# Patient Record
Sex: MALE | Race: BLACK OR AFRICAN AMERICAN | NOT HISPANIC OR LATINO
[De-identification: names, ages, dates, MRNs, and addresses within clinical notes are randomized per-mention and may not be internally consistent; named-entity substitution may affect disease eponyms.]

---

## 2017-06-02 ENCOUNTER — APPOINTMENT (OUTPATIENT)
Dept: HEART AND VASCULAR | Facility: CLINIC | Age: 57
End: 2017-06-02

## 2017-09-26 ENCOUNTER — APPOINTMENT (OUTPATIENT)
Dept: HEART AND VASCULAR | Facility: CLINIC | Age: 57
End: 2017-09-26
Payer: COMMERCIAL

## 2017-09-26 VITALS
HEIGHT: 78 IN | BODY MASS INDEX: 34.71 KG/M2 | SYSTOLIC BLOOD PRESSURE: 140 MMHG | DIASTOLIC BLOOD PRESSURE: 65 MMHG | WEIGHT: 300 LBS | HEART RATE: 83 BPM

## 2017-09-26 DIAGNOSIS — E11.9 TYPE 2 DIABETES MELLITUS W/OUT COMPLICATIONS: ICD-10-CM

## 2017-09-26 DIAGNOSIS — E55.9 VITAMIN D DEFICIENCY, UNSPECIFIED: ICD-10-CM

## 2017-09-26 DIAGNOSIS — I10 ESSENTIAL (PRIMARY) HYPERTENSION: ICD-10-CM

## 2017-09-26 DIAGNOSIS — E78.5 HYPERLIPIDEMIA, UNSPECIFIED: ICD-10-CM

## 2017-09-26 DIAGNOSIS — M10.9 GOUT, UNSPECIFIED: ICD-10-CM

## 2017-09-26 DIAGNOSIS — Z82.49 FAMILY HISTORY OF ISCHEMIC HEART DISEASE AND OTHER DISEASES OF THE CIRCULATORY SYSTEM: ICD-10-CM

## 2017-09-26 PROCEDURE — 93000 ELECTROCARDIOGRAM COMPLETE: CPT

## 2017-09-26 PROCEDURE — 99205 OFFICE O/P NEW HI 60 MIN: CPT

## 2017-09-27 PROBLEM — E78.5 HLD (HYPERLIPIDEMIA): Status: ACTIVE | Noted: 2017-09-26

## 2017-09-27 PROBLEM — I10 HTN (HYPERTENSION): Status: ACTIVE | Noted: 2017-09-26

## 2017-09-27 PROBLEM — E11.9 DIABETES MELLITUS, TYPE II: Status: ACTIVE | Noted: 2017-09-26

## 2017-09-27 PROBLEM — E55.9 VITAMIN D INSUFFICIENCY: Status: ACTIVE | Noted: 2017-09-26

## 2017-09-27 PROBLEM — M10.9 GOUT: Status: ACTIVE | Noted: 2017-09-26

## 2017-10-09 ENCOUNTER — FORM ENCOUNTER (OUTPATIENT)
Age: 57
End: 2017-10-09

## 2017-10-10 ENCOUNTER — OUTPATIENT (OUTPATIENT)
Dept: OUTPATIENT SERVICES | Facility: HOSPITAL | Age: 57
LOS: 1 days | End: 2017-10-10
Payer: COMMERCIAL

## 2017-10-10 DIAGNOSIS — Z98.1 ARTHRODESIS STATUS: Chronic | ICD-10-CM

## 2017-10-10 DIAGNOSIS — Z96.652 PRESENCE OF LEFT ARTIFICIAL KNEE JOINT: Chronic | ICD-10-CM

## 2017-10-10 DIAGNOSIS — R00.2 PALPITATIONS: ICD-10-CM

## 2017-10-10 PROCEDURE — 93306 TTE W/DOPPLER COMPLETE: CPT | Mod: 26

## 2017-10-10 PROCEDURE — 93306 TTE W/DOPPLER COMPLETE: CPT

## 2017-10-12 ENCOUNTER — TRANSCRIPTION ENCOUNTER (OUTPATIENT)
Age: 57
End: 2017-10-12

## 2017-11-21 ENCOUNTER — APPOINTMENT (OUTPATIENT)
Dept: HEART AND VASCULAR | Facility: CLINIC | Age: 57
End: 2017-11-21
Payer: COMMERCIAL

## 2017-11-21 VITALS
HEART RATE: 87 BPM | HEIGHT: 78 IN | SYSTOLIC BLOOD PRESSURE: 155 MMHG | WEIGHT: 300 LBS | BODY MASS INDEX: 34.71 KG/M2 | DIASTOLIC BLOOD PRESSURE: 78 MMHG

## 2017-11-21 PROCEDURE — 93000 ELECTROCARDIOGRAM COMPLETE: CPT

## 2017-11-21 PROCEDURE — 99215 OFFICE O/P EST HI 40 MIN: CPT

## 2017-12-08 ENCOUNTER — OUTPATIENT (OUTPATIENT)
Dept: OUTPATIENT SERVICES | Facility: HOSPITAL | Age: 57
LOS: 1 days | End: 2017-12-08
Payer: COMMERCIAL

## 2017-12-08 DIAGNOSIS — Z96.652 PRESENCE OF LEFT ARTIFICIAL KNEE JOINT: Chronic | ICD-10-CM

## 2017-12-08 DIAGNOSIS — Z98.1 ARTHRODESIS STATUS: Chronic | ICD-10-CM

## 2017-12-08 PROCEDURE — 75561 CARDIAC MRI FOR MORPH W/DYE: CPT | Mod: 26

## 2017-12-08 PROCEDURE — 75561 CARDIAC MRI FOR MORPH W/DYE: CPT

## 2017-12-08 PROCEDURE — A9577: CPT

## 2017-12-14 ENCOUNTER — OUTPATIENT (OUTPATIENT)
Dept: OUTPATIENT SERVICES | Facility: HOSPITAL | Age: 57
LOS: 1 days | Discharge: HOME | End: 2017-12-14
Payer: COMMERCIAL

## 2017-12-14 DIAGNOSIS — E55.9 VITAMIN D DEFICIENCY, UNSPECIFIED: ICD-10-CM

## 2017-12-14 DIAGNOSIS — Z96.652 PRESENCE OF LEFT ARTIFICIAL KNEE JOINT: Chronic | ICD-10-CM

## 2017-12-14 DIAGNOSIS — R55 SYNCOPE AND COLLAPSE: ICD-10-CM

## 2017-12-14 DIAGNOSIS — M10.9 GOUT, UNSPECIFIED: ICD-10-CM

## 2017-12-14 DIAGNOSIS — Z79.82 LONG TERM (CURRENT) USE OF ASPIRIN: ICD-10-CM

## 2017-12-14 DIAGNOSIS — I10 ESSENTIAL (PRIMARY) HYPERTENSION: ICD-10-CM

## 2017-12-14 DIAGNOSIS — Z98.1 ARTHRODESIS STATUS: Chronic | ICD-10-CM

## 2017-12-14 DIAGNOSIS — Z96.652 PRESENCE OF LEFT ARTIFICIAL KNEE JOINT: ICD-10-CM

## 2017-12-14 DIAGNOSIS — E11.9 TYPE 2 DIABETES MELLITUS WITHOUT COMPLICATIONS: ICD-10-CM

## 2017-12-14 DIAGNOSIS — E78.5 HYPERLIPIDEMIA, UNSPECIFIED: ICD-10-CM

## 2017-12-14 DIAGNOSIS — Z79.84 LONG TERM (CURRENT) USE OF ORAL HYPOGLYCEMIC DRUGS: ICD-10-CM

## 2017-12-14 PROCEDURE — 93660 TILT TABLE EVALUATION: CPT | Mod: 26

## 2017-12-14 PROCEDURE — 93660 TILT TABLE EVALUATION: CPT

## 2018-01-30 ENCOUNTER — APPOINTMENT (OUTPATIENT)
Dept: HEART AND VASCULAR | Facility: CLINIC | Age: 58
End: 2018-01-30
Payer: COMMERCIAL

## 2018-01-30 VITALS
WEIGHT: 288 LBS | HEIGHT: 78 IN | SYSTOLIC BLOOD PRESSURE: 138 MMHG | BODY MASS INDEX: 33.32 KG/M2 | DIASTOLIC BLOOD PRESSURE: 69 MMHG | HEART RATE: 68 BPM

## 2018-01-30 PROCEDURE — 99215 OFFICE O/P EST HI 40 MIN: CPT | Mod: 25

## 2018-01-30 PROCEDURE — 93000 ELECTROCARDIOGRAM COMPLETE: CPT

## 2018-02-14 ENCOUNTER — OUTPATIENT (OUTPATIENT)
Dept: OUTPATIENT SERVICES | Facility: HOSPITAL | Age: 58
LOS: 1 days | Discharge: HOME | End: 2018-02-14
Payer: COMMERCIAL

## 2018-02-14 DIAGNOSIS — Z96.652 PRESENCE OF LEFT ARTIFICIAL KNEE JOINT: Chronic | ICD-10-CM

## 2018-02-14 DIAGNOSIS — Z98.1 ARTHRODESIS STATUS: Chronic | ICD-10-CM

## 2018-02-14 LAB — GLUCOSE BLDC GLUCOMTR-MCNC: 241 MG/DL — HIGH (ref 70–99)

## 2018-02-14 PROCEDURE — 33282: CPT

## 2018-02-14 PROCEDURE — 82962 GLUCOSE BLOOD TEST: CPT

## 2018-02-14 PROCEDURE — C1764: CPT

## 2018-04-20 ENCOUNTER — APPOINTMENT (OUTPATIENT)
Dept: HEART AND VASCULAR | Facility: CLINIC | Age: 58
End: 2018-04-20
Payer: COMMERCIAL

## 2018-04-20 VITALS
HEART RATE: 91 BPM | DIASTOLIC BLOOD PRESSURE: 74 MMHG | HEIGHT: 78 IN | WEIGHT: 288 LBS | SYSTOLIC BLOOD PRESSURE: 162 MMHG | BODY MASS INDEX: 33.32 KG/M2

## 2018-04-20 PROCEDURE — 99214 OFFICE O/P EST MOD 30 MIN: CPT | Mod: 25

## 2018-04-20 PROCEDURE — 93285 PRGRMG DEV EVAL SCRMS IP: CPT

## 2018-04-20 RX ORDER — TAMSULOSIN HYDROCHLORIDE 0.4 MG/1
0.4 CAPSULE ORAL
Qty: 90 | Refills: 0 | Status: DISCONTINUED | COMMUNITY
Start: 2017-11-17

## 2018-04-20 RX ORDER — METFORMIN ER 500 MG 500 MG/1
500 TABLET ORAL
Qty: 360 | Refills: 0 | Status: DISCONTINUED | COMMUNITY
Start: 2017-11-15

## 2018-04-20 RX ORDER — CYCLOBENZAPRINE HYDROCHLORIDE 10 MG/1
10 TABLET, FILM COATED ORAL
Qty: 30 | Refills: 0 | Status: DISCONTINUED | COMMUNITY
Start: 2018-03-05

## 2018-04-20 RX ORDER — SIMVASTATIN 40 MG/1
40 TABLET, FILM COATED ORAL
Qty: 90 | Refills: 0 | Status: DISCONTINUED | COMMUNITY
Start: 2017-09-23

## 2018-04-20 RX ORDER — LEVOCETIRIZINE DIHYDROCHLORIDE 5 MG/1
5 TABLET ORAL
Qty: 30 | Refills: 0 | Status: DISCONTINUED | COMMUNITY
Start: 2017-10-30

## 2018-04-20 RX ORDER — HYDROCODONE BITARTRATE AND ACETAMINOPHEN 5; 325 MG/1; MG/1
5-325 TABLET ORAL
Qty: 28 | Refills: 0 | Status: DISCONTINUED | COMMUNITY
Start: 2018-03-06

## 2018-04-20 RX ORDER — MOMETASONE 50 UG/1
50 SPRAY, METERED NASAL
Qty: 17 | Refills: 0 | Status: DISCONTINUED | COMMUNITY
Start: 2017-10-30

## 2018-04-20 RX ORDER — DOXYCYCLINE 100 MG/1
100 TABLET, FILM COATED ORAL
Qty: 20 | Refills: 0 | Status: DISCONTINUED | COMMUNITY
Start: 2017-12-08

## 2018-04-20 RX ORDER — DULAGLUTIDE 0.75 MG/.5ML
0.75 INJECTION, SOLUTION SUBCUTANEOUS
Qty: 4 | Refills: 0 | Status: DISCONTINUED | COMMUNITY
Start: 2018-01-04

## 2018-04-20 RX ORDER — OMEGA-3-ACID ETHYL ESTERS CAPSULES 1 G/1
1 CAPSULE, LIQUID FILLED ORAL
Qty: 360 | Refills: 0 | Status: DISCONTINUED | COMMUNITY
Start: 2018-01-05

## 2018-06-22 ENCOUNTER — APPOINTMENT (OUTPATIENT)
Dept: HEART AND VASCULAR | Facility: CLINIC | Age: 58
End: 2018-06-22
Payer: COMMERCIAL

## 2018-06-22 VITALS
DIASTOLIC BLOOD PRESSURE: 75 MMHG | WEIGHT: 285 LBS | HEIGHT: 78 IN | SYSTOLIC BLOOD PRESSURE: 148 MMHG | BODY MASS INDEX: 32.97 KG/M2 | HEART RATE: 67 BPM

## 2018-06-22 PROCEDURE — 93291 INTERROG DEV EVAL SCRMS IP: CPT

## 2018-08-30 ENCOUNTER — APPOINTMENT (OUTPATIENT)
Dept: PULMONOLOGY | Facility: CLINIC | Age: 58
End: 2018-08-30
Payer: COMMERCIAL

## 2018-09-20 ENCOUNTER — APPOINTMENT (OUTPATIENT)
Dept: PULMONOLOGY | Facility: CLINIC | Age: 58
End: 2018-09-20
Payer: COMMERCIAL

## 2018-09-20 VITALS
OXYGEN SATURATION: 97 % | HEIGHT: 78 IN | HEART RATE: 66 BPM | DIASTOLIC BLOOD PRESSURE: 84 MMHG | WEIGHT: 285 LBS | BODY MASS INDEX: 32.97 KG/M2 | SYSTOLIC BLOOD PRESSURE: 138 MMHG

## 2018-09-20 DIAGNOSIS — Z80.0 FAMILY HISTORY OF MALIGNANT NEOPLASM OF DIGESTIVE ORGANS: ICD-10-CM

## 2018-09-20 DIAGNOSIS — Z82.5 FAMILY HISTORY OF ASTHMA AND OTHER CHRONIC LOWER RESPIRATORY DISEASES: ICD-10-CM

## 2018-09-20 DIAGNOSIS — J45.909 UNSPECIFIED ASTHMA, UNCOMPLICATED: ICD-10-CM

## 2018-09-20 PROCEDURE — 99244 OFF/OP CNSLTJ NEW/EST MOD 40: CPT | Mod: 25

## 2018-09-20 PROCEDURE — G0008: CPT

## 2018-09-20 PROCEDURE — 36415 COLL VENOUS BLD VENIPUNCTURE: CPT

## 2018-09-20 PROCEDURE — 90686 IIV4 VACC NO PRSV 0.5 ML IM: CPT

## 2018-09-21 ENCOUNTER — RESULT REVIEW (OUTPATIENT)
Age: 58
End: 2018-09-21

## 2018-09-21 LAB
BASOPHILS # BLD AUTO: 0.03 K/UL
BASOPHILS NFR BLD AUTO: 0.4 %
DEPRECATED D DIMER PPP IA-ACNC: <150 NG/ML DDU
EOSINOPHIL # BLD AUTO: 0.18 K/UL
EOSINOPHIL NFR BLD AUTO: 2.2 %
HCT VFR BLD CALC: 42.7 %
HGB BLD-MCNC: 13.9 G/DL
IMM GRANULOCYTES NFR BLD AUTO: 0.1 %
LYMPHOCYTES # BLD AUTO: 3.13 K/UL
LYMPHOCYTES NFR BLD AUTO: 38.4 %
MAN DIFF?: NORMAL
MCHC RBC-ENTMCNC: 31.1 PG
MCHC RBC-ENTMCNC: 32.6 GM/DL
MCV RBC AUTO: 95.5 FL
MONOCYTES # BLD AUTO: 0.58 K/UL
MONOCYTES NFR BLD AUTO: 7.1 %
NEUTROPHILS # BLD AUTO: 4.22 K/UL
NEUTROPHILS NFR BLD AUTO: 51.8 %
NT-PROBNP SERPL-MCNC: 276 PG/ML
PLATELET # BLD AUTO: 345 K/UL
RBC # BLD: 4.47 M/UL
RBC # FLD: 13.5 %
WBC # FLD AUTO: 8.15 K/UL

## 2018-09-22 LAB
ALBUMIN SERPL ELPH-MCNC: 4.7 G/DL
ALP BLD-CCNC: 66 U/L
ALT SERPL-CCNC: 20 U/L
ANION GAP SERPL CALC-SCNC: 16 MMOL/L
AST SERPL-CCNC: 22 U/L
BILIRUB SERPL-MCNC: 1.3 MG/DL
BUN SERPL-MCNC: 11 MG/DL
CALCIUM SERPL-MCNC: 10.1 MG/DL
CHLORIDE SERPL-SCNC: 104 MMOL/L
CO2 SERPL-SCNC: 25 MMOL/L
CREAT SERPL-MCNC: 1.07 MG/DL
GLUCOSE SERPL-MCNC: 118 MG/DL
POTASSIUM SERPL-SCNC: 5.1 MMOL/L
PROT SERPL-MCNC: 7.3 G/DL
SODIUM SERPL-SCNC: 145 MMOL/L

## 2018-09-25 ENCOUNTER — CHART COPY (OUTPATIENT)
Age: 58
End: 2018-09-25

## 2018-10-11 ENCOUNTER — OTHER (OUTPATIENT)
Age: 58
End: 2018-10-11

## 2018-10-11 PROBLEM — Z92.29 HISTORY OF INFLUENZA VACCINATION: Status: RESOLVED | Noted: 2018-09-20 | Resolved: 2018-10-11

## 2018-10-12 ENCOUNTER — OUTPATIENT (OUTPATIENT)
Dept: OUTPATIENT SERVICES | Facility: HOSPITAL | Age: 58
LOS: 1 days | End: 2018-10-12
Payer: COMMERCIAL

## 2018-10-12 DIAGNOSIS — Z98.1 ARTHRODESIS STATUS: Chronic | ICD-10-CM

## 2018-10-12 DIAGNOSIS — Z96.652 PRESENCE OF LEFT ARTIFICIAL KNEE JOINT: Chronic | ICD-10-CM

## 2018-10-12 DIAGNOSIS — R06.02 SHORTNESS OF BREATH: ICD-10-CM

## 2018-10-12 PROCEDURE — 94060 EVALUATION OF WHEEZING: CPT

## 2018-10-12 PROCEDURE — 94618 PULMONARY STRESS TESTING: CPT | Mod: 26,59

## 2018-10-12 PROCEDURE — 94729 DIFFUSING CAPACITY: CPT | Mod: 26

## 2018-10-12 PROCEDURE — 94618 PULMONARY STRESS TESTING: CPT

## 2018-10-12 PROCEDURE — 94070 EVALUATION OF WHEEZING: CPT

## 2018-10-12 PROCEDURE — 94729 DIFFUSING CAPACITY: CPT

## 2018-10-12 PROCEDURE — 94726 PLETHYSMOGRAPHY LUNG VOLUMES: CPT

## 2018-10-12 PROCEDURE — 94070 EVALUATION OF WHEEZING: CPT | Mod: 26

## 2018-10-12 PROCEDURE — 94726 PLETHYSMOGRAPHY LUNG VOLUMES: CPT | Mod: 26

## 2018-10-12 PROCEDURE — 94010 BREATHING CAPACITY TEST: CPT | Mod: 26,59

## 2018-10-12 PROCEDURE — 94760 N-INVAS EAR/PLS OXIMETRY 1: CPT

## 2018-10-16 ENCOUNTER — OUTPATIENT (OUTPATIENT)
Dept: OUTPATIENT SERVICES | Facility: HOSPITAL | Age: 58
LOS: 1 days | End: 2018-10-16
Payer: COMMERCIAL

## 2018-10-16 ENCOUNTER — APPOINTMENT (OUTPATIENT)
Dept: SLEEP CENTER | Facility: HOME HEALTH | Age: 58
End: 2018-10-16
Payer: COMMERCIAL

## 2018-10-16 ENCOUNTER — APPOINTMENT (OUTPATIENT)
Dept: PULMONOLOGY | Facility: CLINIC | Age: 58
End: 2018-10-16
Payer: COMMERCIAL

## 2018-10-16 VITALS
WEIGHT: 293 LBS | BODY MASS INDEX: 33.9 KG/M2 | DIASTOLIC BLOOD PRESSURE: 90 MMHG | HEIGHT: 78 IN | OXYGEN SATURATION: 98 % | SYSTOLIC BLOOD PRESSURE: 160 MMHG | TEMPERATURE: 98.6 F | HEART RATE: 80 BPM

## 2018-10-16 DIAGNOSIS — Z96.652 PRESENCE OF LEFT ARTIFICIAL KNEE JOINT: Chronic | ICD-10-CM

## 2018-10-16 DIAGNOSIS — R06.02 SHORTNESS OF BREATH: ICD-10-CM

## 2018-10-16 DIAGNOSIS — Z91.89 OTHER SPECIFIED PERSONAL RISK FACTORS, NOT ELSEWHERE CLASSIFIED: ICD-10-CM

## 2018-10-16 DIAGNOSIS — Z92.29 PERSONAL HISTORY OF OTHER DRUG THERAPY: ICD-10-CM

## 2018-10-16 DIAGNOSIS — Z01.818 ENCOUNTER FOR OTHER PREPROCEDURAL EXAMINATION: ICD-10-CM

## 2018-10-16 DIAGNOSIS — Z98.1 ARTHRODESIS STATUS: Chronic | ICD-10-CM

## 2018-10-16 PROCEDURE — 95800 SLP STDY UNATTENDED: CPT

## 2018-10-16 PROCEDURE — 99215 OFFICE O/P EST HI 40 MIN: CPT

## 2018-10-18 DIAGNOSIS — G47.33 OBSTRUCTIVE SLEEP APNEA (ADULT) (PEDIATRIC): ICD-10-CM

## 2019-05-23 ENCOUNTER — MOBILE ON CALL (OUTPATIENT)
Age: 59
End: 2019-05-23

## 2019-05-23 ENCOUNTER — TRANSCRIPTION ENCOUNTER (OUTPATIENT)
Age: 59
End: 2019-05-23

## 2019-09-13 ENCOUNTER — APPOINTMENT (OUTPATIENT)
Dept: HEART AND VASCULAR | Facility: CLINIC | Age: 59
End: 2019-09-13
Payer: COMMERCIAL

## 2019-09-13 VITALS
HEIGHT: 78 IN | BODY MASS INDEX: 33.32 KG/M2 | DIASTOLIC BLOOD PRESSURE: 72 MMHG | HEART RATE: 79 BPM | WEIGHT: 288 LBS | SYSTOLIC BLOOD PRESSURE: 146 MMHG

## 2019-09-13 PROCEDURE — 93291 INTERROG DEV EVAL SCRMS IP: CPT

## 2019-09-13 NOTE — PHYSICAL EXAM
[General Appearance - Well Developed] : well developed [Normal Appearance] : normal appearance [Well Groomed] : well groomed [General Appearance - Well Nourished] : well nourished [No Deformities] : no deformities [General Appearance - In No Acute Distress] : no acute distress [Normal Conjunctiva] : the conjunctiva exhibited no abnormalities [Eyelids - No Xanthelasma] : the eyelids demonstrated no xanthelasmas [Normal Oral Mucosa] : normal oral mucosa [No Oral Pallor] : no oral pallor [No Oral Cyanosis] : no oral cyanosis [Normal Jugular Venous A Waves Present] : normal jugular venous A waves present [Normal Jugular Venous V Waves Present] : normal jugular venous V waves present [No Jugular Venous Salmon A Waves] : no jugular venous salmon A waves [Respiration, Rhythm And Depth] : normal respiratory rhythm and effort [Exaggerated Use Of Accessory Muscles For Inspiration] : no accessory muscle use [Auscultation Breath Sounds / Voice Sounds] : lungs were clear to auscultation bilaterally [Heart Rate And Rhythm] : heart rate and rhythm were normal [Heart Sounds] : normal S1 and S2 [Murmurs] : no murmurs present [Abdomen Soft] : soft [Abdomen Tenderness] : non-tender [Abdomen Mass (___ Cm)] : no abdominal mass palpated [Abnormal Walk] : normal gait [Gait - Sufficient For Exercise Testing] : the gait was sufficient for exercise testing [Nail Clubbing] : no clubbing of the fingernails [Cyanosis, Localized] : no localized cyanosis [Petechial Hemorrhages (___cm)] : no petechial hemorrhages [Skin Color & Pigmentation] : normal skin color and pigmentation [] : no rash [No Venous Stasis] : no venous stasis [Skin Lesions] : no skin lesions [No Skin Ulcers] : no skin ulcer [No Xanthoma] : no  xanthoma was observed [Oriented To Time, Place, And Person] : oriented to person, place, and time [Affect] : the affect was normal [Mood] : the mood was normal [No Anxiety] : not feeling anxious

## 2019-09-13 NOTE — HISTORY OF PRESENT ILLNESS
[None] : The patient complains of no symptoms [FreeTextEntry1] : 59 y/o M h/o HTN, HLD, DM II, palpitations and presyncope now s/p Medtronic ILR implant, who presents for follow-up.  \par \par Onset of symptoms in June 2016.  He started having episodes of dizzy spells, chest discomfort, diaphoresis and palpitations. The very first episode he felt presyncopal - resolved with rest.  He has not had such an intense episode since that time but they had been increasing in frequency - at one point he had four in one month. He notes that he started using multiple OTC supplements around that time, including cayenne pepper.  He has since stopped using cayenne pepper and continued to have presyncopal episodes with diaphoresis.  He denies any episodes of de syncope.  No family h/o SCD.  He underwent HUT test 12/2017 negative. However, he reports he had two episodes in one day around Annie time which he is concerned about. No episodes during the month of January. He has been working out at the gym, using the stationary bike and does not feel limited.  \par \par He had a Medtronic ILR implanted on 2/14/2018. Since implant, he states that he hasn't had any further episodes. He presents today feeling well. He has been going to work (nightshift), and going to the gym. He denies c/p, lightheadedness, sob and pre/syncope. At last visit, ILR interrogation was significant for a 22 second WCT episode consistent with SVT with aberrancy. The patient believes that he was sleeping during this episode. He was placed on Toprol 25 mg BID at that time.  \par \par cMRI 12/2017 LVEF 49%, mild global HK, RV normal size/function, hyperenhancement of superior and inferior right ventricular insertion points (nonspecific), \par Event monitor 8/24 - 9/4/16: SR/ST, rare PACs, slow WCT possibly NSVT (correlates with symptom)\par CTA 6/2016 with nonobstructive CAD

## 2019-09-13 NOTE — PROCEDURE
[NSR] : normal sinus rhythm [de-identified] : Reveal LINQ [de-identified] : Medtronic  [de-identified] : [id number] [de-identified] : Battery: Good\par 0 symptoms\par 0 tachy \par 0 pause\par 0 lesli\par 0 AT/AF

## 2019-09-13 NOTE — DISCUSSION/SUMMARY
[FreeTextEntry1] : 57 y/o M h/o HTN, HLD, DM II, palpitations and presyncope now s/p Medtronic ILR implant showing SVT with aberrancy, on Toprol with appropriate arrhythmia suppression. Continue current management. No changes made today. \par \par This patient has a planned orthopedic procedure scheduled with Dr. Segura and he is requesting clearance. From an EP/arrhythmia perspective, there are no contraindications to the planned procedure.

## 2020-03-20 ENCOUNTER — NON-APPOINTMENT (OUTPATIENT)
Age: 60
End: 2020-03-20

## 2020-10-20 ENCOUNTER — APPOINTMENT (OUTPATIENT)
Dept: HEART AND VASCULAR | Facility: CLINIC | Age: 60
End: 2020-10-20
Payer: COMMERCIAL

## 2020-10-20 VITALS
WEIGHT: 288 LBS | SYSTOLIC BLOOD PRESSURE: 133 MMHG | BODY MASS INDEX: 33.32 KG/M2 | HEART RATE: 87 BPM | DIASTOLIC BLOOD PRESSURE: 73 MMHG | HEIGHT: 78 IN

## 2020-10-20 PROCEDURE — 99212 OFFICE O/P EST SF 10 MIN: CPT | Mod: 25

## 2020-10-20 PROCEDURE — 99072 ADDL SUPL MATRL&STAF TM PHE: CPT

## 2020-10-20 PROCEDURE — 93285 PRGRMG DEV EVAL SCRMS IP: CPT

## 2020-10-20 NOTE — PHYSICAL EXAM
[General Appearance - Well Developed] : well developed [Normal Appearance] : normal appearance [General Appearance - Well Nourished] : well nourished [No Deformities] : no deformities [General Appearance - In No Acute Distress] : no acute distress [Well Groomed] : well groomed [Heart Rate And Rhythm] : heart rate and rhythm were normal [Murmurs] : no murmurs present [Heart Sounds] : normal S1 and S2 [Respiration, Rhythm And Depth] : normal respiratory rhythm and effort [Auscultation Breath Sounds / Voice Sounds] : lungs were clear to auscultation bilaterally [Exaggerated Use Of Accessory Muscles For Inspiration] : no accessory muscle use [Abdomen Soft] : soft [Abdomen Tenderness] : non-tender [Petechial Hemorrhages (___cm)] : no petechial hemorrhages [Cyanosis, Localized] : no localized cyanosis [Abdomen Mass (___ Cm)] : no abdominal mass palpated [Nail Clubbing] : no clubbing of the fingernails [Normal Conjunctiva] : the conjunctiva exhibited no abnormalities [Eyelids - No Xanthelasma] : the eyelids demonstrated no xanthelasmas [No Oral Pallor] : no oral pallor [No Oral Cyanosis] : no oral cyanosis [Normal Oral Mucosa] : normal oral mucosa [Normal Jugular Venous V Waves Present] : normal jugular venous V waves present [Normal Jugular Venous A Waves Present] : normal jugular venous A waves present [No Jugular Venous Salmon A Waves] : no jugular venous salmon A waves [Gait - Sufficient For Exercise Testing] : the gait was sufficient for exercise testing [Abnormal Walk] : normal gait [Skin Color & Pigmentation] : normal skin color and pigmentation [] : no rash [No Venous Stasis] : no venous stasis [Skin Lesions] : no skin lesions [No Skin Ulcers] : no skin ulcer [No Xanthoma] : no  xanthoma was observed [Oriented To Time, Place, And Person] : oriented to person, place, and time [Mood] : the mood was normal [Affect] : the affect was normal [No Anxiety] : not feeling anxious

## 2020-10-20 NOTE — END OF VISIT
[>50% of Time Spent on Counseling and Coordination of Care for  ___] : Greater than 50% of the encounter time was spent on counseling and coordination of care for [unfilled] [] : Nurse Practitioner [Time Spent: ___ minutes] : I have spent [unfilled] minutes of face to face time with the patient

## 2020-10-26 NOTE — PROCEDURE
[NSR] : normal sinus rhythm [de-identified] : Medtronic  [de-identified] : Reveal LINQ [de-identified] : [id number] [de-identified] : Battery: Good\par 0 symptoms\par 9 tachy - likely ST \par 0 pause\par 0 lesli\par 2 AF, longest 15h\par

## 2020-10-26 NOTE — HISTORY OF PRESENT ILLNESS
[None] : The patient complains of no symptoms [FreeTextEntry1] : 59 y/o M h/o HTN, HLD, DM II, palpitations and presyncope now s/p Medtronic ILR implant, who presents for follow-up.  \par \par Onset of symptoms in June 2016.  He started having episodes of dizzy spells, chest discomfort, diaphoresis and palpitations. The very first episode he felt presyncopal - resolved with rest.  He has not had such an intense episode since that time but they had been increasing in frequency - at one point he had four in one month. He notes that he started using multiple OTC supplements around that time, including cayenne pepper.  He has since stopped using cayenne pepper and continued to have presyncopal episodes with diaphoresis.  He denies any episodes of de syncope.  No family h/o SCD.  He underwent HUT test 12/2017 negative. However, he reports he had two episodes in one day around Annie time which he is concerned about. No episodes during the month of January. He has been working out at the gym, using the stationary bike and does not feel limited.  \par \par He had a Medtronic ILR implanted on 2/14/2018. Since implant, he states that he hasn't had any further episodes. He has been going to work (nightshift), and going to the gym. He denies c/p, lightheadedness, sob and pre/syncope. Past ILR interrogation was significant for a 22 second WCT episode consistent with SVT with aberrancy. The patient believes that he was sleeping during this episode. He was placed on Toprol 25 mg BID at that time.  \par \par He presents today feeling well. 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 He feels better on this schedule and denies any episodes of syncope, lightheadedness, palpitations, sob or c/p.\par \par cMRI 12/2017 LVEF 49%, mild global HK, RV normal size/function, hyperenhancement of superior and inferior right ventricular insertion points (nonspecific), \par Event monitor 8/24 - 9/4/16: SR/ST, rare PACs, slow WCT possibly NSVT (correlates with symptom)\par CTA 6/2016 with nonobstructive CAD

## 2020-10-26 NOTE — REASON FOR VISIT
[Follow-Up - Clinic] : a clinic follow-up of [Atrial Fibrillation] : atrial fibrillation [Palpitations] : palpitations [Follow-up Device Check] : follow-up device check visit [Arrhythmias (seen on stored data)] : arrhythmias seen on stored data

## 2021-02-16 ENCOUNTER — RX RENEWAL (OUTPATIENT)
Age: 61
End: 2021-02-16

## 2021-03-11 ENCOUNTER — APPOINTMENT (OUTPATIENT)
Dept: HEART AND VASCULAR | Facility: CLINIC | Age: 61
End: 2021-03-11
Payer: COMMERCIAL

## 2021-03-11 ENCOUNTER — NON-APPOINTMENT (OUTPATIENT)
Age: 61
End: 2021-03-11

## 2021-03-11 PROCEDURE — G2066: CPT

## 2021-03-11 PROCEDURE — 93298 REM INTERROG DEV EVAL SCRMS: CPT

## 2021-04-15 ENCOUNTER — NON-APPOINTMENT (OUTPATIENT)
Age: 61
End: 2021-04-15

## 2021-04-15 ENCOUNTER — APPOINTMENT (OUTPATIENT)
Dept: HEART AND VASCULAR | Facility: CLINIC | Age: 61
End: 2021-04-15
Payer: COMMERCIAL

## 2021-04-15 PROCEDURE — 93298 REM INTERROG DEV EVAL SCRMS: CPT

## 2021-04-15 PROCEDURE — G2066: CPT

## 2021-04-16 ENCOUNTER — RX RENEWAL (OUTPATIENT)
Age: 61
End: 2021-04-16

## 2021-05-11 ENCOUNTER — APPOINTMENT (OUTPATIENT)
Dept: HEART AND VASCULAR | Facility: CLINIC | Age: 61
End: 2021-05-11
Payer: COMMERCIAL

## 2021-05-11 VITALS
HEART RATE: 83 BPM | WEIGHT: 270 LBS | SYSTOLIC BLOOD PRESSURE: 135 MMHG | BODY MASS INDEX: 31.24 KG/M2 | DIASTOLIC BLOOD PRESSURE: 69 MMHG | HEIGHT: 78 IN

## 2021-05-11 DIAGNOSIS — R00.2 PALPITATIONS: ICD-10-CM

## 2021-05-11 PROCEDURE — 93285 PRGRMG DEV EVAL SCRMS IP: CPT

## 2021-05-11 PROCEDURE — 99072 ADDL SUPL MATRL&STAF TM PHE: CPT

## 2021-05-11 PROCEDURE — 99213 OFFICE O/P EST LOW 20 MIN: CPT | Mod: 25

## 2021-05-11 NOTE — HISTORY OF PRESENT ILLNESS
[None] : The patient complains of no symptoms [FreeTextEntry1] : 62 y/o M h/o HTN, HLD, DM II, palpitations and presyncope now s/p Medtronic ILR implant with paroxysmal atrial fibrillation detected who presents for follow-up.  \par \par Onset of symptoms in June 2016.  He started having episodes of dizzy spells, chest discomfort, diaphoresis and palpitations. The very first episode he felt presyncopal - resolved with rest.  He has not had such an intense episode since that time but they had been increasing in frequency - at one point he had four in one month. He notes that he started using multiple OTC supplements around that time, including cayenne pepper.  He has since stopped using cayenne pepper and continued to have presyncopal episodes with diaphoresis.  He denies any episodes of de syncope.  No family h/o SCD.  He underwent HUT test 12/2017 negative. \par \par He had a Medtronic ILR implanted on 2/14/2018. Since implant, he states that he hasn't had any further episodes.  Past ILR interrogation was significant for a 22 second WCT episode consistent with SVT with aberrancy. The patient believes that he was sleeping during this episode. He was placed on Toprol 25 mg BID at that time.  He had PAF noted in 8/2020; he was unaware.  Started on Eliquis and denies any bleeding issues.\par \par He presents today feeling well. 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 He feels better on this schedule and denies any episodes of syncope, lightheadedness, palpitations, sob or c/p.  Hoping to return to work this Summer.\par \par cMRI 12/2017 LVEF 49%, mild global HK, RV normal size/function, hyperenhancement of superior and inferior right ventricular insertion points (nonspecific), \par Event monitor 8/24 - 9/4/16: SR/ST, rare PACs, slow WCT possibly NSVT (correlates with symptom)\par CTA 6/2016 with nonobstructive CAD

## 2021-05-11 NOTE — PROCEDURE
[NSR] : normal sinus rhythm [de-identified] : Medtronic  [de-identified] : Reveal LINQ [de-identified] : [id number] [de-identified] : Battery: Good\par No new auto or symptom events

## 2021-05-11 NOTE — REASON FOR VISIT
[Follow-Up - Clinic] : a clinic follow-up of [Atrial Fibrillation] : atrial fibrillation [Palpitations] : palpitations [Follow-up Device Check] : is here today for a follow-up device check visit for

## 2021-05-11 NOTE — PHYSICAL EXAM
[General Appearance - Well Developed] : well developed [Normal Appearance] : normal appearance [Well Groomed] : well groomed [General Appearance - Well Nourished] : well nourished [No Deformities] : no deformities [General Appearance - In No Acute Distress] : no acute distress [Heart Rate And Rhythm] : heart rate and rhythm were normal [Heart Sounds] : normal S1 and S2 [Murmurs] : no murmurs present [Respiration, Rhythm And Depth] : normal respiratory rhythm and effort [Exaggerated Use Of Accessory Muscles For Inspiration] : no accessory muscle use [Auscultation Breath Sounds / Voice Sounds] : lungs were clear to auscultation bilaterally [Abdomen Soft] : soft [Abdomen Tenderness] : non-tender [Abdomen Mass (___ Cm)] : no abdominal mass palpated [Nail Clubbing] : no clubbing of the fingernails [Cyanosis, Localized] : no localized cyanosis [Petechial Hemorrhages (___cm)] : no petechial hemorrhages [Normal Conjunctiva] : the conjunctiva exhibited no abnormalities [Eyelids - No Xanthelasma] : the eyelids demonstrated no xanthelasmas [Normal Oral Mucosa] : normal oral mucosa [No Oral Pallor] : no oral pallor [No Oral Cyanosis] : no oral cyanosis [Normal Jugular Venous A Waves Present] : normal jugular venous A waves present [Normal Jugular Venous V Waves Present] : normal jugular venous V waves present [No Jugular Venous Salmon A Waves] : no jugular venous salmon A waves [Abnormal Walk] : normal gait [Gait - Sufficient For Exercise Testing] : the gait was sufficient for exercise testing [Skin Color & Pigmentation] : normal skin color and pigmentation [] : no rash [No Venous Stasis] : no venous stasis [Skin Lesions] : no skin lesions [No Skin Ulcers] : no skin ulcer [No Xanthoma] : no  xanthoma was observed [Oriented To Time, Place, And Person] : oriented to person, place, and time [Affect] : the affect was normal [Mood] : the mood was normal [No Anxiety] : not feeling anxious

## 2021-06-11 ENCOUNTER — RX RENEWAL (OUTPATIENT)
Age: 61
End: 2021-06-11

## 2021-06-14 ENCOUNTER — NON-APPOINTMENT (OUTPATIENT)
Age: 61
End: 2021-06-14

## 2021-06-15 ENCOUNTER — APPOINTMENT (OUTPATIENT)
Dept: HEART AND VASCULAR | Facility: CLINIC | Age: 61
End: 2021-06-15
Payer: COMMERCIAL

## 2021-06-15 PROCEDURE — 93298 REM INTERROG DEV EVAL SCRMS: CPT

## 2021-06-15 PROCEDURE — G2066: CPT

## 2021-07-19 ENCOUNTER — NON-APPOINTMENT (OUTPATIENT)
Age: 61
End: 2021-07-19

## 2021-07-19 ENCOUNTER — APPOINTMENT (OUTPATIENT)
Dept: HEART AND VASCULAR | Facility: CLINIC | Age: 61
End: 2021-07-19
Payer: COMMERCIAL

## 2021-07-19 PROCEDURE — 93298 REM INTERROG DEV EVAL SCRMS: CPT

## 2021-07-19 PROCEDURE — G2066: CPT

## 2021-08-23 ENCOUNTER — NON-APPOINTMENT (OUTPATIENT)
Age: 61
End: 2021-08-23

## 2021-08-23 ENCOUNTER — APPOINTMENT (OUTPATIENT)
Dept: HEART AND VASCULAR | Facility: CLINIC | Age: 61
End: 2021-08-23
Payer: COMMERCIAL

## 2021-08-23 PROCEDURE — G2066: CPT

## 2021-08-23 PROCEDURE — 93298 REM INTERROG DEV EVAL SCRMS: CPT

## 2021-09-27 ENCOUNTER — NON-APPOINTMENT (OUTPATIENT)
Age: 61
End: 2021-09-27

## 2021-09-27 ENCOUNTER — APPOINTMENT (OUTPATIENT)
Dept: HEART AND VASCULAR | Facility: CLINIC | Age: 61
End: 2021-09-27
Payer: COMMERCIAL

## 2021-09-27 PROCEDURE — G2066: CPT

## 2021-09-27 PROCEDURE — 93298 REM INTERROG DEV EVAL SCRMS: CPT

## 2021-11-01 ENCOUNTER — NON-APPOINTMENT (OUTPATIENT)
Age: 61
End: 2021-11-01

## 2021-11-01 ENCOUNTER — APPOINTMENT (OUTPATIENT)
Dept: HEART AND VASCULAR | Facility: CLINIC | Age: 61
End: 2021-11-01
Payer: COMMERCIAL

## 2021-11-01 PROCEDURE — G2066: CPT | Mod: NC

## 2021-11-01 PROCEDURE — 93298 REM INTERROG DEV EVAL SCRMS: CPT

## 2021-12-06 ENCOUNTER — NON-APPOINTMENT (OUTPATIENT)
Age: 61
End: 2021-12-06

## 2021-12-06 ENCOUNTER — APPOINTMENT (OUTPATIENT)
Dept: HEART AND VASCULAR | Facility: CLINIC | Age: 61
End: 2021-12-06
Payer: COMMERCIAL

## 2021-12-06 PROCEDURE — 93298 REM INTERROG DEV EVAL SCRMS: CPT

## 2021-12-06 PROCEDURE — G2066: CPT

## 2022-01-10 ENCOUNTER — NON-APPOINTMENT (OUTPATIENT)
Age: 62
End: 2022-01-10

## 2022-01-10 ENCOUNTER — APPOINTMENT (OUTPATIENT)
Dept: HEART AND VASCULAR | Facility: CLINIC | Age: 62
End: 2022-01-10
Payer: COMMERCIAL

## 2022-01-10 PROCEDURE — G2066: CPT

## 2022-01-10 PROCEDURE — 93298 REM INTERROG DEV EVAL SCRMS: CPT

## 2022-01-25 RX ORDER — APIXABAN 5 MG/1
5 TABLET, FILM COATED ORAL
Qty: 60 | Refills: 3 | Status: DISCONTINUED | COMMUNITY
Start: 2020-10-20 | End: 2022-01-25

## 2022-01-25 RX ORDER — RIVAROXABAN 20 MG/1
20 TABLET, FILM COATED ORAL
Qty: 30 | Refills: 5 | Status: ACTIVE | COMMUNITY
Start: 2022-01-25 | End: 1900-01-01

## 2022-02-14 ENCOUNTER — APPOINTMENT (OUTPATIENT)
Dept: HEART AND VASCULAR | Facility: CLINIC | Age: 62
End: 2022-02-14
Payer: COMMERCIAL

## 2022-02-14 ENCOUNTER — NON-APPOINTMENT (OUTPATIENT)
Age: 62
End: 2022-02-14

## 2022-02-14 PROCEDURE — G2066: CPT

## 2022-02-14 PROCEDURE — 93298 REM INTERROG DEV EVAL SCRMS: CPT

## 2022-03-21 ENCOUNTER — APPOINTMENT (OUTPATIENT)
Dept: HEART AND VASCULAR | Facility: CLINIC | Age: 62
End: 2022-03-21

## 2022-04-06 ENCOUNTER — RX RENEWAL (OUTPATIENT)
Age: 62
End: 2022-04-06

## 2022-04-06 RX ORDER — METOPROLOL SUCCINATE 50 MG/1
50 TABLET, EXTENDED RELEASE ORAL TWICE DAILY
Qty: 90 | Refills: 3 | Status: ACTIVE | COMMUNITY
Start: 2018-04-20 | End: 1900-01-01

## 2022-06-17 ENCOUNTER — NON-APPOINTMENT (OUTPATIENT)
Age: 62
End: 2022-06-17

## 2022-06-17 ENCOUNTER — APPOINTMENT (OUTPATIENT)
Dept: HEART AND VASCULAR | Facility: CLINIC | Age: 62
End: 2022-06-17
Payer: COMMERCIAL

## 2022-06-17 VITALS
HEART RATE: 80 BPM | DIASTOLIC BLOOD PRESSURE: 71 MMHG | HEIGHT: 78 IN | SYSTOLIC BLOOD PRESSURE: 153 MMHG | BODY MASS INDEX: 31.47 KG/M2 | WEIGHT: 272 LBS | TEMPERATURE: 97.5 F

## 2022-06-17 DIAGNOSIS — Z95.818 PRESENCE OF OTHER CARDIAC IMPLANTS AND GRAFTS: ICD-10-CM

## 2022-06-17 PROCEDURE — 99212 OFFICE O/P EST SF 10 MIN: CPT | Mod: 25

## 2022-06-17 PROCEDURE — 93000 ELECTROCARDIOGRAM COMPLETE: CPT

## 2022-06-17 RX ORDER — LOSARTAN POTASSIUM 50 MG/1
50 TABLET, FILM COATED ORAL
Qty: 90 | Refills: 0 | Status: ACTIVE | COMMUNITY
Start: 2022-04-29

## 2022-06-17 RX ORDER — DOXYCYCLINE 100 MG/1
100 CAPSULE ORAL
Qty: 14 | Refills: 0 | Status: COMPLETED | COMMUNITY
Start: 2022-04-15

## 2022-06-17 RX ORDER — METFORMIN HYDROCHLORIDE 500 MG/1
500 TABLET, COATED ORAL TWICE DAILY
Refills: 0 | Status: ACTIVE | COMMUNITY
Start: 2022-03-15

## 2022-06-17 NOTE — REASON FOR VISIT
[Arrhythmia/ECG Abnorrmalities] : arrhythmia/ECG abnormalities [Follow-up Device Check] : is here today for a follow-up device check visit for [Follow-Up - Clinic] : a clinic follow-up of [Atrial Fibrillation] : atrial fibrillation [Palpitations] : palpitations

## 2022-06-17 NOTE — CARDIOLOGY SUMMARY
Refilled medication per Internal Medicine protocol.  Medication(s) e-scribed to preferred pharmacy.   [de-identified] : 6/17/2022: SR 81 bpm

## 2022-06-17 NOTE — HISTORY OF PRESENT ILLNESS
[FreeTextEntry1] : 61 y/o M h/o HTN, HLD, DM II, palpitations and presyncope now s/p Medtronic ILR implant with paroxysmal atrial fibrillation detected who presents for follow-up because his ILR is at [facility].\par \par He feels very well. No device related complaints. In SR today. He would like to discuss having ILR removed. 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 \par \par Hx: Onset of symptoms in June 2016.  He started having episodes of dizzy spells, chest discomfort, diaphoresis and palpitations. The very first episode he felt presyncopal - resolved with rest.  He has not had such an intense episode since that time but they had been increasing in frequency - at one point he had four in one month. He notes that he started using multiple OTC supplements around that time, including cayenne pepper.  He has since stopped using cayenne pepper and continued to have presyncopal episodes with diaphoresis.  He denies any episodes of de syncope.  No family h/o SCD.  He underwent HUT test 12/2017 negative. \par \par He had a Medtronic ILR implanted on 2/14/2018. Since implant, he states that he hasn't had any further episodes.  Past ILR interrogation was significant for a 22 second WCT episode consistent with SVT with aberrancy. The patient believes that he was sleeping during this episode. He was placed on Toprol 25 mg BID at that time.  He had PAF noted in 8/2020; he was unaware.  Started on Eliquis and denies any bleeding issues.\par \par \par \par cMRI 12/2017 LVEF 49%, mild global HK, RV normal size/function, hyperenhancement of superior and inferior right ventricular insertion points (nonspecific), \par Event monitor 8/24 - 9/4/16: SR/ST, rare PACs, slow WCT possibly NSVT (correlates with symptom)\par CTA 6/2016 with nonobstructive CAD [None] : The patient complains of no symptoms

## 2022-06-17 NOTE — PROCEDURE
[NSR] : normal sinus rhythm [de-identified] : Medtronic  [de-identified] : Reveal LINQ [de-identified] : [id number] [de-identified] : Battery: Good\par No new auto or symptom events

## 2022-07-14 LAB — SARS-COV-2 N GENE NPH QL NAA+PROBE: NOT DETECTED

## 2022-07-15 ENCOUNTER — OUTPATIENT (OUTPATIENT)
Dept: OUTPATIENT SERVICES | Facility: HOSPITAL | Age: 62
LOS: 1 days | Discharge: HOME | End: 2022-07-15
Payer: COMMERCIAL

## 2022-07-15 DIAGNOSIS — Z96.652 PRESENCE OF LEFT ARTIFICIAL KNEE JOINT: Chronic | ICD-10-CM

## 2022-07-15 DIAGNOSIS — Z98.1 ARTHRODESIS STATUS: Chronic | ICD-10-CM

## 2022-07-15 PROCEDURE — 33286 RMVL SUBQ CAR RHYTHM MNTR: CPT

## 2022-07-15 NOTE — PROGRESS NOTE ADULT - SUBJECTIVE AND OBJECTIVE BOX
EPS Progress Note    S: 61 y/o M h/o HTN, HLD, DM II, palpitations and presyncope now s/p Medtronic ILR implant with paroxysmal atrial fibrillation detected who presents for ILR removal  because his ILR is at [facility].     MEDICATIONS  (STANDING):   · Allopurinol 300 MG Oral Tablet   · Losartan Potassium 50 MG Oral Tablet; TAKE 1 TABLET BY MOUTH EVERY DAY   · metFORMIN HCl - 500 MG Oral Tablet; TAKE 2 TABLETS TWICE DAILY   · Metoprolol Succinate ER 50 MG Oral Tablet Extended Release 24 Hour; TAKE 1/2  TABLET BY MOUTH TWICE A DAY   · Naproxen 500 MG Oral Tablet   · Simvastatin 20 MG Oral Tablet   · Vicodin ES 7.5-300 MG TABS   · Xarelto 20 MG Oral Tablet; Take 1 tablet daily           General:  NAD        HEENT:  PERRL, EOMI	  Neck: Supple, - JVD; No Carotid Bruits   Cardiovascular: S1 S2, No JVD  Respiratory: CTA B/L    Gastrointestinal:  Soft, Non-tender, + BS	  Skin: No rashes, No ecchymoses, No cyanosis  Extremities: No edema  Psychiatry: A & O x 3	                           Assessment/Plan:    61 y/o M h/o HTN, HLD, DM II, palpitations and presyncope now s/p Medtronic ILR implant with paroxysmal atrial fibrillation detected who presents for ILR removal  because his ILR is at [facility].

## 2022-07-19 DIAGNOSIS — R00.2 PALPITATIONS: ICD-10-CM

## 2022-10-08 ENCOUNTER — INPATIENT (INPATIENT)
Facility: HOSPITAL | Age: 62
LOS: 3 days | Discharge: HOME | DRG: 243 | End: 2022-10-12
Attending: HOSPITALIST | Admitting: INTERNAL MEDICINE
Payer: COMMERCIAL

## 2022-10-08 VITALS
TEMPERATURE: 98 F | OXYGEN SATURATION: 96 % | DIASTOLIC BLOOD PRESSURE: 70 MMHG | SYSTOLIC BLOOD PRESSURE: 137 MMHG | RESPIRATION RATE: 18 BRPM | HEART RATE: 78 BPM | WEIGHT: 274.92 LBS

## 2022-10-08 DIAGNOSIS — Z98.1 ARTHRODESIS STATUS: Chronic | ICD-10-CM

## 2022-10-08 DIAGNOSIS — E78.5 HYPERLIPIDEMIA, UNSPECIFIED: ICD-10-CM

## 2022-10-08 DIAGNOSIS — Z96.652 PRESENCE OF LEFT ARTIFICIAL KNEE JOINT: Chronic | ICD-10-CM

## 2022-10-08 DIAGNOSIS — E11.9 TYPE 2 DIABETES MELLITUS WITHOUT COMPLICATIONS: ICD-10-CM

## 2022-10-08 DIAGNOSIS — I48.0 PAROXYSMAL ATRIAL FIBRILLATION: ICD-10-CM

## 2022-10-08 DIAGNOSIS — I10 ESSENTIAL (PRIMARY) HYPERTENSION: ICD-10-CM

## 2022-10-08 DIAGNOSIS — R55 SYNCOPE AND COLLAPSE: ICD-10-CM

## 2022-10-08 DIAGNOSIS — Z96.653 PRESENCE OF ARTIFICIAL KNEE JOINT, BILATERAL: Chronic | ICD-10-CM

## 2022-10-08 LAB
ALBUMIN SERPL ELPH-MCNC: 4.3 G/DL — SIGNIFICANT CHANGE UP (ref 3.3–5)
ALP SERPL-CCNC: 77 U/L — SIGNIFICANT CHANGE UP (ref 40–120)
ALT FLD-CCNC: 22 U/L — SIGNIFICANT CHANGE UP (ref 10–45)
ANION GAP SERPL CALC-SCNC: 12 MMOL/L — SIGNIFICANT CHANGE UP (ref 5–17)
APTT BLD: 32.6 SEC — SIGNIFICANT CHANGE UP (ref 27.5–35.5)
AST SERPL-CCNC: 26 U/L — SIGNIFICANT CHANGE UP (ref 10–40)
BASOPHILS # BLD AUTO: 0.02 K/UL — SIGNIFICANT CHANGE UP (ref 0–0.2)
BASOPHILS NFR BLD AUTO: 0.2 % — SIGNIFICANT CHANGE UP (ref 0–2)
BILIRUB SERPL-MCNC: 1.8 MG/DL — HIGH (ref 0.2–1.2)
BUN SERPL-MCNC: 16 MG/DL — SIGNIFICANT CHANGE UP (ref 7–23)
CALCIUM SERPL-MCNC: 10 MG/DL — SIGNIFICANT CHANGE UP (ref 8.4–10.5)
CHLORIDE SERPL-SCNC: 101 MMOL/L — SIGNIFICANT CHANGE UP (ref 96–108)
CO2 SERPL-SCNC: 26 MMOL/L — SIGNIFICANT CHANGE UP (ref 22–31)
CREAT SERPL-MCNC: 1.35 MG/DL — HIGH (ref 0.5–1.3)
EGFR: 59 ML/MIN/1.73M2 — LOW
EOSINOPHIL # BLD AUTO: 0.04 K/UL — SIGNIFICANT CHANGE UP (ref 0–0.5)
EOSINOPHIL NFR BLD AUTO: 0.4 % — SIGNIFICANT CHANGE UP (ref 0–6)
GLUCOSE SERPL-MCNC: 167 MG/DL — HIGH (ref 70–99)
HCT VFR BLD CALC: 39.6 % — SIGNIFICANT CHANGE UP (ref 39–50)
HGB BLD-MCNC: 13.1 G/DL — SIGNIFICANT CHANGE UP (ref 13–17)
IMM GRANULOCYTES NFR BLD AUTO: 0.4 % — SIGNIFICANT CHANGE UP (ref 0–0.9)
INR BLD: 1.21 — HIGH (ref 0.88–1.16)
LYMPHOCYTES # BLD AUTO: 0.65 K/UL — LOW (ref 1–3.3)
LYMPHOCYTES # BLD AUTO: 7.1 % — LOW (ref 13–44)
MAGNESIUM SERPL-MCNC: 2.1 MG/DL — SIGNIFICANT CHANGE UP (ref 1.6–2.6)
MCHC RBC-ENTMCNC: 31.1 PG — SIGNIFICANT CHANGE UP (ref 27–34)
MCHC RBC-ENTMCNC: 33.1 GM/DL — SIGNIFICANT CHANGE UP (ref 32–36)
MCV RBC AUTO: 94.1 FL — SIGNIFICANT CHANGE UP (ref 80–100)
MONOCYTES # BLD AUTO: 1.19 K/UL — HIGH (ref 0–0.9)
MONOCYTES NFR BLD AUTO: 13 % — SIGNIFICANT CHANGE UP (ref 2–14)
NEUTROPHILS # BLD AUTO: 7.2 K/UL — SIGNIFICANT CHANGE UP (ref 1.8–7.4)
NEUTROPHILS NFR BLD AUTO: 78.9 % — HIGH (ref 43–77)
NRBC # BLD: 0 /100 WBCS — SIGNIFICANT CHANGE UP (ref 0–0)
NT-PROBNP SERPL-SCNC: 1218 PG/ML — HIGH (ref 0–300)
PLATELET # BLD AUTO: 306 K/UL — SIGNIFICANT CHANGE UP (ref 150–400)
POTASSIUM SERPL-MCNC: 4.7 MMOL/L — SIGNIFICANT CHANGE UP (ref 3.5–5.3)
POTASSIUM SERPL-SCNC: 4.7 MMOL/L — SIGNIFICANT CHANGE UP (ref 3.5–5.3)
PROT SERPL-MCNC: 7.3 G/DL — SIGNIFICANT CHANGE UP (ref 6–8.3)
PROTHROM AB SERPL-ACNC: 14.4 SEC — HIGH (ref 10.5–13.4)
RBC # BLD: 4.21 M/UL — SIGNIFICANT CHANGE UP (ref 4.2–5.8)
RBC # FLD: 13 % — SIGNIFICANT CHANGE UP (ref 10.3–14.5)
SARS-COV-2 RNA SPEC QL NAA+PROBE: NEGATIVE — SIGNIFICANT CHANGE UP
SODIUM SERPL-SCNC: 139 MMOL/L — SIGNIFICANT CHANGE UP (ref 135–145)
TROPONIN T SERPL-MCNC: 0.01 NG/ML — SIGNIFICANT CHANGE UP (ref 0–0.01)
WBC # BLD: 9.14 K/UL — SIGNIFICANT CHANGE UP (ref 3.8–10.5)
WBC # FLD AUTO: 9.14 K/UL — SIGNIFICANT CHANGE UP (ref 3.8–10.5)

## 2022-10-08 PROCEDURE — 73030 X-RAY EXAM OF SHOULDER: CPT | Mod: 26,LT

## 2022-10-08 PROCEDURE — 73030 X-RAY EXAM OF SHOULDER: CPT | Mod: 26

## 2022-10-08 PROCEDURE — 99284 EMERGENCY DEPT VISIT MOD MDM: CPT | Mod: 25

## 2022-10-08 PROCEDURE — 93010 ELECTROCARDIOGRAM REPORT: CPT

## 2022-10-08 PROCEDURE — 71045 X-RAY EXAM CHEST 1 VIEW: CPT | Mod: 26

## 2022-10-08 RX ORDER — SODIUM CHLORIDE 9 MG/ML
1000 INJECTION, SOLUTION INTRAVENOUS
Refills: 0 | Status: DISCONTINUED | OUTPATIENT
Start: 2022-10-08 | End: 2022-10-09

## 2022-10-08 RX ORDER — LOSARTAN POTASSIUM 100 MG/1
50 TABLET, FILM COATED ORAL DAILY
Refills: 0 | Status: DISCONTINUED | OUTPATIENT
Start: 2022-10-09 | End: 2022-10-12

## 2022-10-08 RX ORDER — LEVOCETIRIZINE DIHYDROCHLORIDE 0.5 MG/ML
1 SOLUTION ORAL
Qty: 0 | Refills: 0 | DISCHARGE

## 2022-10-08 RX ORDER — GABAPENTIN 400 MG/1
1 CAPSULE ORAL
Qty: 0 | Refills: 0 | DISCHARGE

## 2022-10-08 RX ORDER — RIVAROXABAN 15 MG-20MG
1 KIT ORAL
Qty: 0 | Refills: 0 | DISCHARGE

## 2022-10-08 RX ORDER — DEXTROSE 50 % IN WATER 50 %
25 SYRINGE (ML) INTRAVENOUS ONCE
Refills: 0 | Status: DISCONTINUED | OUTPATIENT
Start: 2022-10-08 | End: 2022-10-09

## 2022-10-08 RX ORDER — LORATADINE 10 MG/1
10 TABLET ORAL AT BEDTIME
Refills: 0 | Status: DISCONTINUED | OUTPATIENT
Start: 2022-10-09 | End: 2022-10-12

## 2022-10-08 RX ORDER — ACETAMINOPHEN 500 MG
1000 TABLET ORAL ONCE
Refills: 0 | Status: COMPLETED | OUTPATIENT
Start: 2022-10-08 | End: 2022-10-08

## 2022-10-08 RX ORDER — SIMVASTATIN 20 MG/1
1 TABLET, FILM COATED ORAL
Qty: 0 | Refills: 0 | DISCHARGE

## 2022-10-08 RX ORDER — METOPROLOL TARTRATE 50 MG
25 TABLET ORAL EVERY 12 HOURS
Refills: 0 | Status: DISCONTINUED | OUTPATIENT
Start: 2022-10-09 | End: 2022-10-09

## 2022-10-08 RX ORDER — OXYCODONE AND ACETAMINOPHEN 5; 325 MG/1; MG/1
1 TABLET ORAL ONCE
Refills: 0 | Status: DISCONTINUED | OUTPATIENT
Start: 2022-10-08 | End: 2022-10-08

## 2022-10-08 RX ORDER — DEXTROSE 50 % IN WATER 50 %
15 SYRINGE (ML) INTRAVENOUS ONCE
Refills: 0 | Status: DISCONTINUED | OUTPATIENT
Start: 2022-10-08 | End: 2022-10-09

## 2022-10-08 RX ORDER — METFORMIN HYDROCHLORIDE 850 MG/1
1 TABLET ORAL
Qty: 0 | Refills: 0 | DISCHARGE

## 2022-10-08 RX ORDER — INSULIN LISPRO 100/ML
VIAL (ML) SUBCUTANEOUS
Refills: 0 | Status: DISCONTINUED | OUTPATIENT
Start: 2022-10-08 | End: 2022-10-09

## 2022-10-08 RX ORDER — RIVAROXABAN 15 MG-20MG
20 KIT ORAL
Refills: 0 | Status: DISCONTINUED | OUTPATIENT
Start: 2022-10-09 | End: 2022-10-10

## 2022-10-08 RX ORDER — TAMSULOSIN HYDROCHLORIDE 0.4 MG/1
0.4 CAPSULE ORAL AT BEDTIME
Refills: 0 | Status: DISCONTINUED | OUTPATIENT
Start: 2022-10-09 | End: 2022-10-12

## 2022-10-08 RX ORDER — DEXTROSE 50 % IN WATER 50 %
12.5 SYRINGE (ML) INTRAVENOUS ONCE
Refills: 0 | Status: DISCONTINUED | OUTPATIENT
Start: 2022-10-08 | End: 2022-10-09

## 2022-10-08 RX ORDER — SIMVASTATIN 20 MG/1
40 TABLET, FILM COATED ORAL AT BEDTIME
Refills: 0 | Status: DISCONTINUED | OUTPATIENT
Start: 2022-10-09 | End: 2022-10-12

## 2022-10-08 RX ORDER — TAMSULOSIN HYDROCHLORIDE 0.4 MG/1
1 CAPSULE ORAL
Qty: 0 | Refills: 0 | DISCHARGE

## 2022-10-08 RX ORDER — METOPROLOL TARTRATE 50 MG
1 TABLET ORAL
Qty: 0 | Refills: 0 | DISCHARGE

## 2022-10-08 RX ORDER — GLUCAGON INJECTION, SOLUTION 0.5 MG/.1ML
1 INJECTION, SOLUTION SUBCUTANEOUS ONCE
Refills: 0 | Status: DISCONTINUED | OUTPATIENT
Start: 2022-10-08 | End: 2022-10-09

## 2022-10-08 RX ORDER — ACETAMINOPHEN 500 MG
650 TABLET ORAL EVERY 6 HOURS
Refills: 0 | Status: DISCONTINUED | OUTPATIENT
Start: 2022-10-09 | End: 2022-10-09

## 2022-10-08 RX ORDER — ALLOPURINOL 300 MG
300 TABLET ORAL AT BEDTIME
Refills: 0 | Status: DISCONTINUED | OUTPATIENT
Start: 2022-10-09 | End: 2022-10-12

## 2022-10-08 RX ADMIN — OXYCODONE AND ACETAMINOPHEN 1 TABLET(S): 5; 325 TABLET ORAL at 23:24

## 2022-10-08 RX ADMIN — Medication 1000 MILLIGRAM(S): at 16:49

## 2022-10-08 RX ADMIN — Medication 400 MILLIGRAM(S): at 16:24

## 2022-10-08 NOTE — ED PROVIDER NOTE - CROS ED CONS ALL NEG
Patient is a 80y old  Female who presents with a chief complaint of I was dizzy and out of breathe (03 Aug 2018 14:09)      INTERVAL HPI/OVERNIGHT EVENTS: Pt states she feels much better. Still complaining of high output of liquid stool in the colostomy. Denies fever, chills, CP, SOB, paresthesias, abd pain.      MEDICATIONS  (STANDING):  aspirin enteric coated 81 milliGRAM(s) Oral daily  BACItracin   Ointment 1 Application(s) Topical two times a day  calcium acetate 1334 milliGRAM(s) Oral three times a day with meals  cefTRIAXone   IVPB 1 Gram(s) IV Intermittent every 24 hours  cholestyramine Powder (Sugar-Free) 4 Gram(s) Oral two times a day  FLUoxetine 20 milliGRAM(s) Oral daily  heparin  Injectable 5000 Unit(s) SubCutaneous every 8 hours  lactobacillus acidophilus 1 Tablet(s) Oral daily  loperamide 2 milliGRAM(s) Oral five times a day  metoprolol tartrate 12.5 milliGRAM(s) Oral two times a day  simvastatin 20 milliGRAM(s) Oral at bedtime  sodium chloride 0.45%. 1000 milliLiter(s) (100 mL/Hr) IV Continuous <Continuous>    MEDICATIONS  (PRN):  acetaminophen   Tablet. 650 milliGRAM(s) Oral every 6 hours PRN pain  diphenoxylate/atropine 1 Tablet(s) Oral three times a day PRN Diarrhea      Allergies    Levaquin (Pruritus)  mercury (Pruritus; Rash)  sulfa drugs (Pruritus)    Intolerances        REVIEW OF SYSTEMS:  CONSTITUTIONAL: +generalized weakness (improving); No fever or chills  HEENT:  No headache, no sore throat  RESPIRATORY: No cough, wheezing, or shortness of breath  CARDIOVASCULAR: No chest pain, palpitations, or leg swelling  GASTROINTESTINAL: No abd pain, nausea, vomiting; ++diarrhea  GENITOURINARY: No dysuria, frequency, or hematuria, though has renae in currently  NEUROLOGICAL: no focal weakness or dizziness  MUSCULOSKELETAL: no myalgias     Vital Signs Last 24 Hrs  T(C): 37.1 (05 Aug 2018 23:13), Max: 37.1 (05 Aug 2018 04:29)  T(F): 98.7 (05 Aug 2018 23:13), Max: 98.8 (05 Aug 2018 04:29)  HR: 59 (05 Aug 2018 23:13) (55 - 65)  BP: 124/72 (05 Aug 2018 23:13) (103/62 - 128/62)  BP(mean): --  RR: 18 (05 Aug 2018 23:13) (16 - 18)  SpO2: 97% (05 Aug 2018 23:13) (96% - 100%)    PHYSICAL EXAM:  GENERAL: NAD  HEENT:  anicteric, moist mucous membranes  CHEST/LUNG:  CTA b/l, no rales, wheezes, or rhonchi  HEART:  RRR, S1, S2  ABDOMEN:  BS+, soft, nontender, nondistended, colostomy in place  : renae in place, draining light yellow urine  EXTREMITIES: no edema, cyanosis, or calf tenderness  NERVOUS SYSTEM: AA&Ox3    LABS:                        8.3    6.59  )-----------( 192      ( 05 Aug 2018 08:36 )             24.4     CBC Full  -  ( 05 Aug 2018 08:36 )  WBC Count : 6.59 K/uL  Hemoglobin : 8.3 g/dL  Hematocrit : 24.4 %  Platelet Count - Automated : 192 K/uL  Mean Cell Volume : 89.7 fl  Mean Cell Hemoglobin : 30.5 pg  Mean Cell Hemoglobin Concentration : 34.0 gm/dL  Auto Neutrophil # : 4.25 K/uL  Auto Lymphocyte # : 1.18 K/uL  Auto Monocyte # : 0.52 K/uL  Auto Eosinophil # : 0.59 K/uL  Auto Basophil # : 0.03 K/uL  Auto Neutrophil % : 64.4 %  Auto Lymphocyte % : 17.9 %  Auto Monocyte % : 7.9 %  Auto Eosinophil % : 9.0 %  Auto Basophil % : 0.5 %    05 Aug 2018 08:36    142    |  103    |  82     ----------------------------<  85     3.1     |  24     |  7.00     Ca    6.7        05 Aug 2018 08:36  Phos  5.5       05 Aug 2018 08:36  Mg     1.2       05 Aug 2018 08:36    TPro  x      /  Alb  2.5    /  TBili  x      /  DBili  x      /  AST  x      /  ALT  x      /  AlkPhos  x      05 Aug 2018 08:36        CAPILLARY BLOOD GLUCOSE      POCT Blood Glucose.: 123 mg/dL (05 Aug 2018 12:09)        Culture - Blood (collected 08-02-18 @ 21:55)  Source: .Blood Blood-Venous  Preliminary Report (08-03-18 @ 22:01):    No growth to date.    Culture - Blood (collected 08-02-18 @ 21:55)  Source: .Blood Blood-Venous  Preliminary Report (08-03-18 @ 22:01):    No growth to date.    Culture - Urine (collected 08-02-18 @ 21:39)  Source: .Urine Clean Catch (Midstream)  Final Report (08-04-18 @ 21:03):    >100,000 CFU/ml Serratia marcescens  Organism: Serratia marcescens (08-04-18 @ 21:03)  Organism: Serratia marcescens (08-04-18 @ 21:03)      -  Amikacin: S <=8      -  Amoxicillin/Clavulanic Acid: R >16/8      -  Ampicillin: R 16 These ampicillin results predict results for amoxicillin      -  Ampicillin/Sulbactam: R 16/8      -  Aztreonam: S <=4      -  Cefazolin: R >16      -  Cefepime: S <=2      -  Cefoxitin: R 16      -  Ceftriaxone: S <=1 Enterobacter, Citrobacter, and Serratia may develop resistance during prolonged therapy      -  Ciprofloxacin: I 2      -  Ertapenem: S <=0.5      -  Gentamicin: S <=1      -  Imipenem: S <=1      -  Levofloxacin: S <=1      -  Meropenem: S <=1      -  Nitrofurantoin: R >64 Should not be used to treat pyelonephritis      -  Piperacillin/Tazobactam: S <=8      -  Tigecycline: S 2      -  Tobramycin: S <=2      -  Trimethoprim/Sulfamethoxazole: S <=0.5/9.5      Method Type: SUBHASH        RADIOLOGY & ADDITIONAL TESTS:    Personally reviewed.     Consultant(s) Notes Reviewed:  [x] YES  [ ] NO negative...

## 2022-10-08 NOTE — ED PROVIDER NOTE - OBJECTIVE STATEMENT
63 y/o M h/o HTN, HLD, DM II w complaints of syncope yesterday. Pt stated he was having intermittent dizzy spells yesterday and then had episode of loc. Assoc head injury and nasal fracture. 63 y/o M h/o HTN, HLD, DM II w complaints of syncope yesterday. Pt stated he was having intermittent dizzy spells yesterday and then had episode of loc while at work w no prodrome. Assoc head injury and nasal fracture, stated had head CT yesterday, diagnosed w nasal fracture. 63 y/o M h/o HTN, HLD, DM II w complaints of syncope yesterday. Pt stated he was having intermittent dizzy spells yesterday and then had episode of loc while at work w no prodrome. Assoc head injury and nasal fracture, stated had head CT yesterday neg, diagnosed w nasal fracture at [facility]. Was told during dizzy spells at osh that was in ?afib w RVR. No current chest pain, sob, headache, abd pain, focal weakness/numbness. +L shoulder pain s/p fall.

## 2022-10-08 NOTE — H&P ADULT - PROBLEM SELECTOR PLAN 2
-Hx of presyncope, s/p ILR revealing paroxysmal Afib and episode of SVT w/aberrancy (ILR removed 07/2022). Follows with Dr. Nunes.   -Currently sinus rhythm  -continue home Toprol XL 25mg BID and Xarelto 20mg daily

## 2022-10-08 NOTE — ED ADULT NURSE NOTE - NSICDXPASTMEDICALHX_GEN_ALL_CORE_FT
PAST MEDICAL HISTORY:  Arthritis     DM (diabetes mellitus)     High cholesterol     HTN (hypertension)

## 2022-10-08 NOTE — H&P ADULT - PROBLEM SELECTOR PLAN 1
-Sudden LOC without prodrome while standing at work on 10/7, but reported intermittent lightheadedness earlier in the day. Patient has hx of presyncope (LH, diaphoresis) but never LOC prior to this episode  -Pt seen at [facility] 10/7 where head CT reportedly negative and he states his HR was in 200s when he had episodes of lightheadedness (pt left AMA, wanted his care to be at [facility])  -Trop negative x1, EKG SR, PACs   -Monitor tele- sinus rhythm thus far  -Echo ordered in light of systolic murmur (though patient reports normal echo and NST within last few months at Dr. Horne's office- try to obtain collateral)  -f/u orthostatics  -EP consult

## 2022-10-08 NOTE — ED ADULT NURSE REASSESSMENT NOTE - NS ED NURSE REASSESS COMMENT FT1
pt reports chest pressure.  Rates as 2/10.   Sinus Rhythm with PACs on monitor, HR 79.   Respirations non labored.

## 2022-10-08 NOTE — H&P ADULT - NSICDXPASTSURGICALHX_GEN_ALL_CORE_FT
PAST SURGICAL HISTORY:  H/O spinal fusion     H/O total knee replacement, left      PAST SURGICAL HISTORY:  H/O spinal fusion     S/P TKR (total knee replacement), bilateral

## 2022-10-08 NOTE — H&P ADULT - PROBLEM SELECTOR PLAN 6
-home regimen: metformin 1000mg BID, Trulicity qweek  -f/u A1c  -corrective insulin for now    #DVT PPx- xarelto    #Dispo- pending work-up as above  No anticipated SW needs    Case d/w Cardiology Fellow

## 2022-10-08 NOTE — H&P ADULT - NSHPPHYSICALEXAM_GEN_ALL_CORE
Vital Signs Last 24 Hrs  T(C): 37.6 (08 Oct 2022 18:35), Max: 37.6 (08 Oct 2022 18:35)  T(F): 99.7 (08 Oct 2022 18:35), Max: 99.7 (08 Oct 2022 18:35)  HR: 91 (08 Oct 2022 21:06) (78 - 91)  BP: 137/61 (08 Oct 2022 21:06) (112/55 - 137/70)  BP(mean): --  RR: 16 (08 Oct 2022 21:06) (16 - 18)  SpO2: 96% (08 Oct 2022 21:06) (96% - 97%)    Parameters below as of 08 Oct 2022 21:06  Patient On (Oxygen Delivery Method): room air

## 2022-10-08 NOTE — ED PROVIDER NOTE - CLINICAL SUMMARY MEDICAL DECISION MAKING FREE TEXT BOX
s/p syncope w head injury, ?told afib w RVR in rate 200s, pending labs, likely admit for syncope eval. s/p syncope w head injury and L shoulder pain, ?told afib w RVR in rate 200s while at osh during dizzy spells, pending labs, likely admit for syncope eval, will continue to monitor. per pt head CT neg at osh, no focal neuro deficits. s/p syncope w head injury and L shoulder pain, ?told afib w RVR in rate 200s while at osh during dizzy spells, pending labs, likely admit for syncope eval, will continue to monitor on tele in ED, obtain labs, cxr, XR L shoulder.

## 2022-10-08 NOTE — ED ADULT NURSE NOTE - OBJECTIVE STATEMENT
pt had syncopal episode while at work last night around 2330.  pt does not recall events immediately prior to syncope, but states was going to use the photocopier then next thing recalls was on the floor with coworkers surrounding him.  pt states syncope and fall to ground was witnessed by coworkers.  pt was taken to ER at [place] and pt states had CT of head there.  pt states while in ER his heartrate was rapid and irregular and they wanted to admit him to [place] to monitor the heart.  pt reports hx of a-fib.  pt signed out from [place] and went home at 0830 today.  pt called PMD who told him to come to this ER.    pt had laceration on right side of nose sutured at [place].  pt diagnosed with nasal fx at [place].      pt. aa+ox4.  Respirations non labored.  sutures noted to right side of nose - edges approximated, no drainage.   superficial lacerations noted to left hand/wrist.

## 2022-10-08 NOTE — H&P ADULT - HISTORY OF PRESENT ILLNESS
INCOMPLETE    63 y/o M PMhx HTN, HLD, DM II,  hx of palpitations/presyncope s/p ILR implant (2018, removed 07/2022) revealing SVT w/aberrancy and paroxysmal Afib (on Xarelto), who presented to [facility] ER 10/8 for evaluation after syncope yesterday. Pt stated he was having intermittent dizzy spells yesterday and then had episode of LOC while at work with no prodrome. He was seen at [place] and had head CT that was negative but diagnosed with nasal fracture. He states while at [place] he was told that during dizzy spells he was in Afib w RVR in 200s?.     In the ER, patient HD stable, afebrile, /55, HR 79, SPO2 97% on RA, RR 16. Labs notable for trop negative x1, BNP 1218, Cr 1.35, lytes WNL, CBC unremarkable. EKG NSR 86bpm. CXR and shoulder XR without acute abnormality. COVID PCR negative. He received tylenol 1g IV. Admitted to cardiac tele for syncope work-up.    INCOMPLETE    63 y/o M with PMHx HTN, HLD, DM II, obesity, paroxysmal Afib (on xarelto) and SVT w/aberrancy, presented to [facility] ER 10/8 for evaluation after a syncopal episode yesterday. Pt stated he has been in his usual state of health but  was having intermittent lightheadedness spells yesterday and then had episode of LOC while at work with no prodrome. He was seen at [place] and had head CT that was negative but diagnosed with nasal fracture. He states while at [place] he was told that during dizzy spells he was in Afib w RVR in 200s?.     In the ER, patient HD stable, afebrile, /55, HR 79, SPO2 97% on RA, RR 16. Labs notable for trop negative x1, BNP 1218, Cr 1.35, lytes WNL, CBC unremarkable. EKG NSR 86bpm. CXR and shoulder XR without acute abnormality. COVID PCR negative. He received tylenol 1g IV. Admitted to cardiac tele for syncope work-up.    INCOMPLETE    63 y/o M with PMHx HTN, HLD, DM II, obesity, paroxysmal Afib (on xarelto) and SVT w/aberrancy, presented to [facility] ER 10/8 for evaluation after a syncopal episode yesterday. Pt stated he has been in his usual state of health but began having intermittent lightheadedness while at work yesterday. 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 He was taken to [place] where he had head CT was reportedly negative and was diagnosed with a nasal fracture and had a nasal laceration closed with sutures. He states while there, he had episodes of lightheadedness while on telemetry monitor and reports a heart rate in the 200s. He was not happy with the care he was receiving there and had to tend to his dog so he left AMA and notified his PCP Dr. Horne who advised him to come to [facility].     In the ER, patient HD stable, afebrile, /55, HR 79, SPO2 97% on RA, RR 16. Labs notable for trop negative x1, BNP 1218, Cr 1.35, lytes WNL, CBC unremarkable. EKG NSR 86bpm. CXR and shoulder XR without acute abnormality. COVID PCR negative. He received tylenol 1g IV. Admitted to cardiac tele for syncope work-up.    INCOMPLETE    61 y/o M with PMHx HTN, HLD, DM II, obesity, paroxysmal Afib (on xarelto) and SVT w/aberrancy, presented to [facility] ER 10/8 for evaluation after a syncopal episode yesterday. Pt stated he has been in his usual state of health but began having intermittent lightheadedness while at work yesterday. 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 He was taken to [place] where he had head CT was reportedly negative and was diagnosed with a nasal fracture and had a nasal laceration closed with sutures. He states while there, he had episodes of lightheadedness while on telemetry monitor and reports a heart rate in the 200s. He was not happy with the care he was receiving and had to return home tend to his dog so he left A and notified his PCP Dr. Horne who advised him to come to [facility] for further work-up.     In the ER, patient HD stable, afebrile, /55, HR 79, SPO2 97% on RA, RR 16. Labs notable for trop negative x1, BNP 1218, Cr 1.35, lytes WNL, CBC unremarkable. EKG NSR 86bpm. CXR and shoulder XR without acute abnormality. COVID PCR negative. He received tylenol 1g IV. Admitted to cardiac tele for syncope work-up.    63 y/o M with PMHx HTN, HLD, DM II, obesity, paroxysmal Afib (on xarelto) and SVT w/aberrancy, presented to [facility] ER 10/8 for evaluation after a syncopal episode yesterday. Pt stated he has been in his usual state of health but began having intermittent lightheadedness while at work yesterday. 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 He was taken to [place] where he had head CT was reportedly negative and was diagnosed with a nasal fracture and had a nasal laceration closed with sutures. He states while there, he had episodes of lightheadedness while on telemetry monitor and reports a heart rate in the 200s. Pt had preferred to come to [facility], was not happy with the care he was receiving and had to return home tend to his dog so he left A and notified his PCP Dr. Horne who advised him to come to [facility] for further work-up. Pt reports having a normal NST and unremarkable echo within the last few months with his PCP.     In the ER, patient HD stable, afebrile, /55, HR 79, SPO2 97% on RA, RR 16. Labs notable for trop negative x1, BNP 1218, Cr 1.35, lytes WNL, CBC unremarkable. EKG NSR 86bpm. CXR and shoulder XR without acute abnormality. COVID PCR negative. He received tylenol 1g IV. Admitted to cardiac tele for syncope work-up.    61 y/o M with PMHx HTN, HLD, DM II, obesity, paroxysmal Afib (on xarelto) and SVT w/aberrancy, presented to [facility] ER 10/8 for evaluation after a syncopal episode yesterday. Pt stated he has been in his usual state of health but began having intermittent lightheadedness while at work yesterday. 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 He was taken to [place] where he had head CT was reportedly negative and was diagnosed with a nasal fracture and had a nasal laceration closed with sutures. He states while there, he had episodes of lightheadedness while on telemetry monitor and reports a heart rate in the 200s. Pt had preferred to come to [facility], was not happy with the care he was receiving and had to return home tend to his dog so he left A and notified his PCP Dr. Horne who advised him to come to [facility] for further work-up. Pt reports having a normal NST and unremarkable echo within the last few months with his PCP. Pt denies ever experiencing CP, SOB, orthopnea/PND, leg swelling, or recent illness, fever, chills.     In the ER, patient HD stable, afebrile, /55, HR 79, SPO2 97% on RA, RR 16. Labs notable for trop negative x1, BNP 1218, Cr 1.35, lytes WNL, CBC unremarkable. EKG NSR 86bpm. CXR and shoulder XR without acute abnormality. COVID PCR negative. He received tylenol 1g IV. Admitted to cardiac tele for syncope work-up.

## 2022-10-08 NOTE — H&P ADULT - PROBLEM SELECTOR PLAN 5
-home regimen: metformin 1000mg BID, Trulicity qweek  -f/u A1c  -corrective insulin for now    #DVT PPx- xarelto    #Dispo- pending work-up as above  No anticipated SW needs    Case d/w Cardiology Fellow Cr 1.35 on arrival (unknown baseline, pt does not know if he has CKD)  -f/u UA  -obtain collateral from PCP  -daily BMP

## 2022-10-08 NOTE — H&P ADULT - NSICDXPASTMEDICALHX_GEN_ALL_CORE_FT
PAST MEDICAL HISTORY:  Arthritis     DM (diabetes mellitus)     High cholesterol     HTN (hypertension)     Paroxysmal atrial fibrillation      PAST MEDICAL HISTORY:  Arthritis     DM (diabetes mellitus)     Gout     High cholesterol     HTN (hypertension)     Paroxysmal atrial fibrillation

## 2022-10-08 NOTE — H&P ADULT - NSHPLABSRESULTS_GEN_ALL_CORE
LABS:                        13.1   9.14  )-----------( 306      ( 08 Oct 2022 14:35 )             39.6       10-08    139  |  101  |  16  ----------------------------<  167<H>  4.7   |  26  |  1.35<H>    Ca    10.0      08 Oct 2022 14:35  Mg     2.1     10-08    TPro  7.3  /  Alb  4.3  /  TBili  1.8<H>  /  DBili  x   /  AST  26  /  ALT  22  /  AlkPhos  77  10-08      PT/INR - ( 08 Oct 2022 14:35 )   PT: 14.4 sec;   INR: 1.21          PTT - ( 08 Oct 2022 14:35 )  PTT:32.6 sec    CARDIAC MARKERS ( 08 Oct 2022 14:35 )  x     / 0.01 ng/mL / x     / x     / x          EKG: SR with PACs, 85bpm, J point elevation I, AVL

## 2022-10-08 NOTE — ED ADULT TRIAGE NOTE - CHIEF COMPLAINT QUOTE
Pt sent in by cardiologist for syncope yesterday. +head injury, pt seen in another ED received stitches, CT scan, but states "this is my hospital." Hx of afib. EKG in progress.

## 2022-10-09 DIAGNOSIS — M10.9 GOUT, UNSPECIFIED: ICD-10-CM

## 2022-10-09 DIAGNOSIS — S01.21XA LACERATION WITHOUT FOREIGN BODY OF NOSE, INITIAL ENCOUNTER: ICD-10-CM

## 2022-10-09 DIAGNOSIS — N40.0 BENIGN PROSTATIC HYPERPLASIA WITHOUT LOWER URINARY TRACT SYMPTOMS: ICD-10-CM

## 2022-10-09 DIAGNOSIS — N28.9 DISORDER OF KIDNEY AND URETER, UNSPECIFIED: ICD-10-CM

## 2022-10-09 LAB
A1C WITH ESTIMATED AVERAGE GLUCOSE RESULT: 6.8 % — HIGH (ref 4–5.6)
ANION GAP SERPL CALC-SCNC: 9 MMOL/L — SIGNIFICANT CHANGE UP (ref 5–17)
BUN SERPL-MCNC: 16 MG/DL — SIGNIFICANT CHANGE UP (ref 7–23)
CALCIUM SERPL-MCNC: 9.4 MG/DL — SIGNIFICANT CHANGE UP (ref 8.4–10.5)
CHLORIDE SERPL-SCNC: 100 MMOL/L — SIGNIFICANT CHANGE UP (ref 96–108)
CHOLEST SERPL-MCNC: 150 MG/DL — SIGNIFICANT CHANGE UP
CO2 SERPL-SCNC: 29 MMOL/L — SIGNIFICANT CHANGE UP (ref 22–31)
CREAT SERPL-MCNC: 1.21 MG/DL — SIGNIFICANT CHANGE UP (ref 0.5–1.3)
EGFR: 67 ML/MIN/1.73M2 — SIGNIFICANT CHANGE UP
ESTIMATED AVERAGE GLUCOSE: 148 MG/DL — HIGH (ref 68–114)
GLUCOSE BLDC GLUCOMTR-MCNC: 149 MG/DL — HIGH (ref 70–99)
GLUCOSE BLDC GLUCOMTR-MCNC: 160 MG/DL — HIGH (ref 70–99)
GLUCOSE BLDC GLUCOMTR-MCNC: 173 MG/DL — HIGH (ref 70–99)
GLUCOSE BLDC GLUCOMTR-MCNC: 206 MG/DL — HIGH (ref 70–99)
GLUCOSE SERPL-MCNC: 212 MG/DL — HIGH (ref 70–99)
HCT VFR BLD CALC: 37.6 % — LOW (ref 39–50)
HCV AB S/CO SERPL IA: 0.03 S/CO — SIGNIFICANT CHANGE UP
HCV AB SERPL-IMP: SIGNIFICANT CHANGE UP
HDLC SERPL-MCNC: 56 MG/DL — SIGNIFICANT CHANGE UP
HGB BLD-MCNC: 12.4 G/DL — LOW (ref 13–17)
LIPID PNL WITH DIRECT LDL SERPL: 63 MG/DL — SIGNIFICANT CHANGE UP
MAGNESIUM SERPL-MCNC: 2.1 MG/DL — SIGNIFICANT CHANGE UP (ref 1.6–2.6)
MCHC RBC-ENTMCNC: 31.6 PG — SIGNIFICANT CHANGE UP (ref 27–34)
MCHC RBC-ENTMCNC: 33 GM/DL — SIGNIFICANT CHANGE UP (ref 32–36)
MCV RBC AUTO: 95.9 FL — SIGNIFICANT CHANGE UP (ref 80–100)
NON HDL CHOLESTEROL: 94 MG/DL — SIGNIFICANT CHANGE UP
NRBC # BLD: 0 /100 WBCS — SIGNIFICANT CHANGE UP (ref 0–0)
PLATELET # BLD AUTO: 274 K/UL — SIGNIFICANT CHANGE UP (ref 150–400)
POTASSIUM SERPL-MCNC: 4.7 MMOL/L — SIGNIFICANT CHANGE UP (ref 3.5–5.3)
POTASSIUM SERPL-SCNC: 4.7 MMOL/L — SIGNIFICANT CHANGE UP (ref 3.5–5.3)
RBC # BLD: 3.92 M/UL — LOW (ref 4.2–5.8)
RBC # FLD: 13.1 % — SIGNIFICANT CHANGE UP (ref 10.3–14.5)
SODIUM SERPL-SCNC: 138 MMOL/L — SIGNIFICANT CHANGE UP (ref 135–145)
T4 FREE SERPL-MCNC: 0.9 NG/DL — LOW (ref 0.93–1.7)
TRIGL SERPL-MCNC: 153 MG/DL — HIGH
TSH SERPL-MCNC: 0.84 UIU/ML — SIGNIFICANT CHANGE UP (ref 0.27–4.2)
WBC # BLD: 7.13 K/UL — SIGNIFICANT CHANGE UP (ref 3.8–10.5)
WBC # FLD AUTO: 7.13 K/UL — SIGNIFICANT CHANGE UP (ref 3.8–10.5)

## 2022-10-09 PROCEDURE — 99233 SBSQ HOSP IP/OBS HIGH 50: CPT

## 2022-10-09 RX ORDER — SODIUM CHLORIDE 9 MG/ML
1000 INJECTION, SOLUTION INTRAVENOUS
Refills: 0 | Status: DISCONTINUED | OUTPATIENT
Start: 2022-10-09 | End: 2022-10-10

## 2022-10-09 RX ORDER — GLUCAGON INJECTION, SOLUTION 0.5 MG/.1ML
1 INJECTION, SOLUTION SUBCUTANEOUS ONCE
Refills: 0 | Status: DISCONTINUED | OUTPATIENT
Start: 2022-10-09 | End: 2022-10-11

## 2022-10-09 RX ORDER — OXYCODONE AND ACETAMINOPHEN 5; 325 MG/1; MG/1
1 TABLET ORAL EVERY 6 HOURS
Refills: 0 | Status: DISCONTINUED | OUTPATIENT
Start: 2022-10-09 | End: 2022-10-12

## 2022-10-09 RX ORDER — DEXTROSE 50 % IN WATER 50 %
25 SYRINGE (ML) INTRAVENOUS ONCE
Refills: 0 | Status: DISCONTINUED | OUTPATIENT
Start: 2022-10-09 | End: 2022-10-10

## 2022-10-09 RX ORDER — DEXTROSE 50 % IN WATER 50 %
12.5 SYRINGE (ML) INTRAVENOUS ONCE
Refills: 0 | Status: DISCONTINUED | OUTPATIENT
Start: 2022-10-09 | End: 2022-10-10

## 2022-10-09 RX ORDER — INFLUENZA VIRUS VACCINE 15; 15; 15; 15 UG/.5ML; UG/.5ML; UG/.5ML; UG/.5ML
0.5 SUSPENSION INTRAMUSCULAR ONCE
Refills: 0 | Status: DISCONTINUED | OUTPATIENT
Start: 2022-10-09 | End: 2022-10-12

## 2022-10-09 RX ORDER — DEXTROSE 50 % IN WATER 50 %
15 SYRINGE (ML) INTRAVENOUS ONCE
Refills: 0 | Status: DISCONTINUED | OUTPATIENT
Start: 2022-10-09 | End: 2022-10-10

## 2022-10-09 RX ORDER — INSULIN LISPRO 100/ML
VIAL (ML) SUBCUTANEOUS
Refills: 0 | Status: DISCONTINUED | OUTPATIENT
Start: 2022-10-09 | End: 2022-10-12

## 2022-10-09 RX ADMIN — SIMVASTATIN 40 MILLIGRAM(S): 20 TABLET, FILM COATED ORAL at 22:19

## 2022-10-09 RX ADMIN — Medication 650 MILLIGRAM(S): at 14:51

## 2022-10-09 RX ADMIN — Medication 25 MILLIGRAM(S): at 18:05

## 2022-10-09 RX ADMIN — OXYCODONE AND ACETAMINOPHEN 1 TABLET(S): 5; 325 TABLET ORAL at 18:57

## 2022-10-09 RX ADMIN — Medication 300 MILLIGRAM(S): at 22:19

## 2022-10-09 RX ADMIN — LORATADINE 10 MILLIGRAM(S): 10 TABLET ORAL at 22:18

## 2022-10-09 RX ADMIN — RIVAROXABAN 20 MILLIGRAM(S): KIT at 09:42

## 2022-10-09 RX ADMIN — Medication 650 MILLIGRAM(S): at 06:10

## 2022-10-09 RX ADMIN — TAMSULOSIN HYDROCHLORIDE 0.4 MILLIGRAM(S): 0.4 CAPSULE ORAL at 22:19

## 2022-10-09 RX ADMIN — Medication 4: at 06:53

## 2022-10-09 RX ADMIN — OXYCODONE AND ACETAMINOPHEN 1 TABLET(S): 5; 325 TABLET ORAL at 19:26

## 2022-10-09 RX ADMIN — Medication 650 MILLIGRAM(S): at 13:19

## 2022-10-09 RX ADMIN — OXYCODONE AND ACETAMINOPHEN 1 TABLET(S): 5; 325 TABLET ORAL at 00:54

## 2022-10-09 RX ADMIN — Medication 650 MILLIGRAM(S): at 07:15

## 2022-10-09 RX ADMIN — LOSARTAN POTASSIUM 50 MILLIGRAM(S): 100 TABLET, FILM COATED ORAL at 06:05

## 2022-10-09 RX ADMIN — Medication 25 MILLIGRAM(S): at 06:53

## 2022-10-09 RX ADMIN — Medication 2: at 18:04

## 2022-10-09 NOTE — PATIENT PROFILE ADULT - FUNCTIONAL ASSESSMENT - DAILY ACTIVITY 6.
"Chief Complaint   Patient presents with     A.D.H.D     Initial /80 (BP Location: Right arm, Patient Position: Sitting, Cuff Size: Adult Large)   Pulse 96   Temp 99  F (37.2  C) (Oral)   Resp 24   Ht 1.835 m (6' 0.25\")   Wt 108 kg (238 lb 1.6 oz)   BMI 32.07 kg/m   Estimated body mass index is 32.07 kg/m  as calculated from the following:    Height as of this encounter: 1.835 m (6' 0.25\").    Weight as of this encounter: 108 kg (238 lb 1.6 oz).  BP completed using cuff size large right arm    Lisa Magill, CMA    " 4 = No assist / stand by assistance

## 2022-10-09 NOTE — PROGRESS NOTE ADULT - PROBLEM SELECTOR PLAN 9
-Continue Tamsulosin 0.4mg QD    F: None  E: Replete if K<4 or Mag<2  N: DASH Diet  VTEppx: Xarelto   Dispo: cardiac tele

## 2022-10-09 NOTE — PROGRESS NOTE ADULT - SUBJECTIVE AND OBJECTIVE BOX
12.4   7.13  )-----------( 274      ( 09 Oct 2022 05:30 )             37.6       10-09    138  |  100  |  16  ----------------------------<  212<H>  4.7   |  29  |  1.21    Ca    9.4      09 Oct 2022 05:30  Mg     2.1     10-09    TPro  7.3  /  Alb  4.3  /  TBili  1.8<H>  /  DBili  x   /  AST  26  /  ALT  22  /  AlkPhos  77  10-08      PT/INR - ( 08 Oct 2022 14:35 )   PT: 14.4 sec;   INR: 1.21          PTT - ( 08 Oct 2022 14:35 )  PTT:32.6 sec    CARDIAC MARKERS ( 08 Oct 2022 14:35 )  x     / 0.01 ng/mL / x     / x     / x                EKG: Cardiology PA Adult Progress Note    SUBJECTIVE ASSESSMENT:  	  MEDICATIONS:  losartan 50 milliGRAM(s) Oral daily  metoprolol succinate ER 25 milliGRAM(s) Oral every 12 hours  tamsulosin 0.4 milliGRAM(s) Oral at bedtime  loratadine 10 milliGRAM(s) Oral at bedtime  acetaminophen     Tablet .. 650 milliGRAM(s) Oral every 6 hours PRN  allopurinol 300 milliGRAM(s) Oral at bedtime  insulin lispro (ADMELOG) corrective regimen sliding scale   SubCutaneous three times a day before meals  simvastatin 40 milliGRAM(s) Oral at bedtime  rivaroxaban 20 milliGRAM(s) Oral <User Schedule>  	  VITAL SIGNS:  T(C): 37.1 (10-09-22 @ 10:14), Max: 37.6 (10-08-22 @ 18:35)  HR: 80 (10-09-22 @ 09:27) (78 - 91)  BP: 141/78 (10-09-22 @ 09:27) (112/55 - 162/77)  RR: 16 (10-09-22 @ 09:27) (16 - 18)  SpO2: 98% (10-09-22 @ 09:27) (96% - 99%)    Height (cm): 198.1 (10-09 @ 05:30)  Weight (kg): 128.1 (10-09 @ 05:30)  BMI (kg/m2): 32.6 (10-09 @ 05:30)  BSA (m2): 2.61 (10-09 @ 05:30)    I&O's Summary  08 Oct 2022 07:01  -  09 Oct 2022 07:00  --------------------------------------------------------  IN: 0 mL / OUT: 0 mL / NET: 0 mL    09 Oct 2022 07:01  -  09 Oct 2022 10:33  --------------------------------------------------------  IN: 240 mL / OUT: 0 mL / NET: 240 mL                                     PHYSICAL EXAM:  Appearance: Normal	  HEENT: Normal oral mucosa, PERRL, EOMI	  Neck: Supple, + JVD/ - JVD; ___ Carotid Bruit   Cardiovascular: Normal S1 S2, No murmurs  Respiratory: Lungs clear to auscultation/Decreased Breath Sounds/No Rales, Rhonchi, Wheezing	  Gastrointestinal:  Soft, Non-tender, + BS	  Skin: No rashes, No ecchymoses, No cyanosis  Extremities: Normal range of motion, No clubbing, cyanosis or edema  Vascular: Peripheral pulses palpable 2+ bilaterally  Neurologic: Non-focal  Psychiatry: A & O x 3, Mood & affect appropriate    LABS:	 	                   12.4   7.13  )-----------( 274      ( 09 Oct 2022 05:30 )             37.6     10-09    138  |  100  |  16  ----------------------------<  212<H>  4.7   |  29  |  1.21    Ca    9.4      09 Oct 2022 05:30  Mg     2.1     10-09    TPro  7.3  /  Alb  4.3  /  TBili  1.8<H>  /  DBili  x   /  AST  26  /  ALT  22  /  AlkPhos  77  10-08    proBNP: Serum Pro-Brain Natriuretic Peptide: 1218 pg/mL (10-08 @ 14:35)    PT/INR - ( 08 Oct 2022 14:35 )   PT: 14.4 sec;   INR: 1.21        PTT - ( 08 Oct 2022 14:35 )  PTT:32.6 sec Cardiology PA Adult Progress Note    SUBJECTIVE ASSESSMENT: Pt seen and examined this AM sitting up comfortably at side of bed w/o any complaints or events overnight. Pt reports he has not had any more episodes of lightheadedness or dizziness since at the hospital. He reports mild discomfort at laceration site but denies any difficulty breathing or vision changes. Pt also denies any CP, palpitations, SOB, orthopnea, N/V or abd pain.  	  MEDICATIONS:  losartan 50 milliGRAM(s) Oral daily  metoprolol succinate ER 25 milliGRAM(s) Oral every 12 hours  tamsulosin 0.4 milliGRAM(s) Oral at bedtime  loratadine 10 milliGRAM(s) Oral at bedtime  acetaminophen     Tablet .. 650 milliGRAM(s) Oral every 6 hours PRN  allopurinol 300 milliGRAM(s) Oral at bedtime  insulin lispro (ADMELOG) corrective regimen sliding scale   SubCutaneous three times a day before meals  simvastatin 40 milliGRAM(s) Oral at bedtime  rivaroxaban 20 milliGRAM(s) Oral <User Schedule>  	  VITAL SIGNS:  T(C): 37.1 (10-09-22 @ 10:14), Max: 37.6 (10-08-22 @ 18:35)  HR: 80 (10-09-22 @ 09:27) (78 - 91)  BP: 141/78 (10-09-22 @ 09:27) (112/55 - 162/77)  RR: 16 (10-09-22 @ 09:27) (16 - 18)  SpO2: 98% (10-09-22 @ 09:27) (96% - 99%)    Height (cm): 198.1 (10-09 @ 05:30)  Weight (kg): 128.1 (10-09 @ 05:30)  BMI (kg/m2): 32.6 (10-09 @ 05:30)  BSA (m2): 2.61 (10-09 @ 05:30)    I&O's Summary  08 Oct 2022 07:01  -  09 Oct 2022 07:00  --------------------------------------------------------  IN: 0 mL / OUT: 0 mL / NET: 0 mL    09 Oct 2022 07:01  -  09 Oct 2022 10:33  --------------------------------------------------------  IN: 240 mL / OUT: 0 mL / NET: 240 mL                                     PHYSICAL EXAM:  Appearance: Normal	  HEENT: Normal oral mucosa, PERRL, EOMI	  Neck: Supple, - JVD; no Carotid Bruit   Cardiovascular: Normal S1 S2, No murmurs  Respiratory: Lungs clear to auscultation, No Rales, Rhonchi, Wheezing	  Gastrointestinal:  Soft, Non-tender, + BS	  Skin: No rashes, No ecchymoses, No cyanosis  Extremities: Normal range of motion, No clubbing, cyanosis or edema  Vascular: Peripheral pulses palpable 2+ bilaterally  Neurologic: Non-focal  Psychiatry: A & O x 3, Mood & affect appropriate    LABS:	 	                   12.4   7.13  )-----------( 274      ( 09 Oct 2022 05:30 )             37.6     10-09    138  |  100  |  16  ----------------------------<  212<H>  4.7   |  29  |  1.21    Ca    9.4      09 Oct 2022 05:30  Mg     2.1     10-09    TPro  7.3  /  Alb  4.3  /  TBili  1.8<H>  /  DBili  x   /  AST  26  /  ALT  22  /  AlkPhos  77  10-08    proBNP: Serum Pro-Brain Natriuretic Peptide: 1218 pg/mL (10-08 @ 14:35)    PT/INR - ( 08 Oct 2022 14:35 )   PT: 14.4 sec;   INR: 1.21        PTT - ( 08 Oct 2022 14:35 )  PTT:32.6 sec

## 2022-10-09 NOTE — PROGRESS NOTE ADULT - PROBLEM SELECTOR PLAN 1
presents w/ sudden LOC w/o prodrome while standing at work, pt endorses intermittent lightheadedness prior in the day; pt currently asymptomatic and HD stable  -h/o presyncope and s/p ILR revealing SVT w/ aberrancy and AF, now s/p ILR removal 7/2022  -orthostatics negative and VSS  -Trop neg x 1, EKG non ischemic and no events on telemetry  -TTE 10/2017: borderline LVH, normal LVEF w/o WMA, no significant valvular disease. Repeat TTE ordered  -cMRI 12/2017: LVEF 49% w/ global hypokinesis, trace TR  -Pt endorses normal NST recently @ [facility], will obtain collaterals in AM  -EP consulted, f/u recs  -Carotid US ordered

## 2022-10-09 NOTE — PROGRESS NOTE ADULT - NS ATTEND AMEND GEN_ALL_CORE FT
Patient seen and examined. Case discussed with ACP.     62M HTN HL prev ILR since removed (showed afib and SVT w/ aberrancy), on Xarelto DM gout who presents after syncope. No prodrome, found himself on the ground. +facial trauma. Was at OSH but left to come here.   EKG:  Orthostatics neg. Labs with nl hgb, Cr 1.35 -> 1.2    -tele monitoring  -ep c/s given syncope with no prodrome and facial trauma, high risk features  -TTE  -carotid Doppler  -pt reports recent normal stress - need collateral  -hold BB until evaluated by EP in AM  -continue home meds losartan, statin   -dispo pending above gerri Cristobal MD Patient seen and examined. Case discussed with ACP.     62M HTN HL prev ILR since removed (showed afib and SVT w/ aberrancy), on Xarelto DM gout who presents after syncope. No prodrome, found himself on the ground. +facial trauma. Was at OSH but left to come here.   EKG: SR PACs PRWP NSTWA inferior leads, early repol I  Orthostatics neg. Labs with nl hgb, Cr 1.35 -> 1.2    -tele monitoring  -ep c/s given syncope with no prodrome and facial trauma, high risk features  -TTE  -carotid Doppler  -pt reports recent normal stress - need collateral  -hold BB until evaluated by EP in AM  -continue home meds losartan, statin   -dispo pending above gerri Cristobal MD

## 2022-10-09 NOTE — PROGRESS NOTE ADULT - ASSESSMENT
61yo obese Male w/ FHx CAD and PMHx HTN, HLD, h/o pre syncope s/p ILR 2/2018 revealing AFib and SVT w/ aberrancy (on Xarelto, s/p ILR removal 7/2022), DM-II, Gout and BPH, presented to [facility] ED 10/8 after a syncopal episode w/o prodrome, suffering nose laceration s/p repair @ [facility] (pt signed out AMA and requesting care @ [facility]), pt now admitted to cardiac telemetry for further syncope w/u.

## 2022-10-09 NOTE — PROGRESS NOTE ADULT - PROBLEM SELECTOR PLAN 2
pt suffered laceration of nose after LOC, s/p nasal sutures placed at [facility] 10/7  -continue Tylenol 650mg Q6 PRN pain  -suture removal in 1 week

## 2022-10-09 NOTE — PATIENT PROFILE ADULT - FALL HARM RISK - HARM RISK INTERVENTIONS
Assistance with ambulation/Assistance OOB with selected safe patient handling equipment/Communicate Risk of Fall with Harm to all staff/Discuss with provider need for PT consult/Monitor gait and stability/Provide patient with walking aids - walker, cane, crutches/Reinforce activity limits and safety measures with patient and family/Sit up slowly, dangle for a short time, stand at bedside before walking/Tailored Fall Risk Interventions/Visual Cue: Yellow wristband and red socks/Bed in lowest position, wheels locked, appropriate side rails in place/Call bell, personal items and telephone in reach/Instruct patient to call for assistance before getting out of bed or chair/Non-slip footwear when patient is out of bed/[place] to call system/Physically safe environment - no spills, clutter or unnecessary equipment/Purposeful Proactive Rounding/Room/bathroom lighting operational, light cord in reach

## 2022-10-09 NOTE — PATIENT PROFILE ADULT - DO YOU LACK THE NECESSARY SUPPORT TO HELP YOU COPE WITH LIFE CHALLENGES?
----- Message from Linh Enrique MD sent at 6/13/2018  8:14 AM CDT -----  The antibiotic be tested for was negative. She could try taking oral vitamin B-12. She should take 1000 µg of B-12 daily and then let's recheck a B-12 level in 3 months   no

## 2022-10-09 NOTE — PATIENT PROFILE ADULT - NSPROIMPLANTSMEDDEV_GEN_A_NUR
Pharmacy Tube Feeding Consult    Medication reviewed for administration by feeding tube and for potential food/drug interactions.    Recommendation: No changes are needed at this time.     Pharmacy will continue to follow as new medications are ordered.    Carolina Mcleod RP     Double knee replacement/Artificial joint

## 2022-10-10 ENCOUNTER — TRANSCRIPTION ENCOUNTER (OUTPATIENT)
Age: 62
End: 2022-10-10

## 2022-10-10 LAB
ANION GAP SERPL CALC-SCNC: 11 MMOL/L — SIGNIFICANT CHANGE UP (ref 5–17)
APPEARANCE UR: CLEAR — SIGNIFICANT CHANGE UP
BACTERIA # UR AUTO: PRESENT /HPF
BILIRUB UR-MCNC: NEGATIVE — SIGNIFICANT CHANGE UP
BUN SERPL-MCNC: 14 MG/DL — SIGNIFICANT CHANGE UP (ref 7–23)
CALCIUM SERPL-MCNC: 9.1 MG/DL — SIGNIFICANT CHANGE UP (ref 8.4–10.5)
CHLORIDE SERPL-SCNC: 98 MMOL/L — SIGNIFICANT CHANGE UP (ref 96–108)
CO2 SERPL-SCNC: 27 MMOL/L — SIGNIFICANT CHANGE UP (ref 22–31)
COLOR SPEC: YELLOW — SIGNIFICANT CHANGE UP
CREAT SERPL-MCNC: 1.01 MG/DL — SIGNIFICANT CHANGE UP (ref 0.5–1.3)
DIFF PNL FLD: NEGATIVE — SIGNIFICANT CHANGE UP
EGFR: 84 ML/MIN/1.73M2 — SIGNIFICANT CHANGE UP
EPI CELLS # UR: SIGNIFICANT CHANGE UP /HPF (ref 0–5)
GLUCOSE BLDC GLUCOMTR-MCNC: 137 MG/DL — HIGH (ref 70–99)
GLUCOSE BLDC GLUCOMTR-MCNC: 165 MG/DL — HIGH (ref 70–99)
GLUCOSE BLDC GLUCOMTR-MCNC: 167 MG/DL — HIGH (ref 70–99)
GLUCOSE BLDC GLUCOMTR-MCNC: 169 MG/DL — HIGH (ref 70–99)
GLUCOSE SERPL-MCNC: 175 MG/DL — HIGH (ref 70–99)
GLUCOSE UR QL: NEGATIVE — SIGNIFICANT CHANGE UP
HCT VFR BLD CALC: 39.6 % — SIGNIFICANT CHANGE UP (ref 39–50)
HGB BLD-MCNC: 12.8 G/DL — LOW (ref 13–17)
KETONES UR-MCNC: 15 MG/DL
LEUKOCYTE ESTERASE UR-ACNC: NEGATIVE — SIGNIFICANT CHANGE UP
MAGNESIUM SERPL-MCNC: 1.8 MG/DL — SIGNIFICANT CHANGE UP (ref 1.6–2.6)
MCHC RBC-ENTMCNC: 31 PG — SIGNIFICANT CHANGE UP (ref 27–34)
MCHC RBC-ENTMCNC: 32.3 GM/DL — SIGNIFICANT CHANGE UP (ref 32–36)
MCV RBC AUTO: 95.9 FL — SIGNIFICANT CHANGE UP (ref 80–100)
NITRITE UR-MCNC: NEGATIVE — SIGNIFICANT CHANGE UP
NRBC # BLD: 0 /100 WBCS — SIGNIFICANT CHANGE UP (ref 0–0)
PH UR: 6 — SIGNIFICANT CHANGE UP (ref 5–8)
PLATELET # BLD AUTO: 276 K/UL — SIGNIFICANT CHANGE UP (ref 150–400)
POTASSIUM SERPL-MCNC: 4.4 MMOL/L — SIGNIFICANT CHANGE UP (ref 3.5–5.3)
POTASSIUM SERPL-SCNC: 4.4 MMOL/L — SIGNIFICANT CHANGE UP (ref 3.5–5.3)
PROT UR-MCNC: 30 MG/DL
RBC # BLD: 4.13 M/UL — LOW (ref 4.2–5.8)
RBC # FLD: 12.5 % — SIGNIFICANT CHANGE UP (ref 10.3–14.5)
RBC CASTS # UR COMP ASSIST: < 5 /HPF — SIGNIFICANT CHANGE UP
SODIUM SERPL-SCNC: 136 MMOL/L — SIGNIFICANT CHANGE UP (ref 135–145)
SP GR SPEC: 1.02 — SIGNIFICANT CHANGE UP (ref 1–1.03)
UROBILINOGEN FLD QL: 0.2 E.U./DL — SIGNIFICANT CHANGE UP
WBC # BLD: 6.64 K/UL — SIGNIFICANT CHANGE UP (ref 3.8–10.5)
WBC # FLD AUTO: 6.64 K/UL — SIGNIFICANT CHANGE UP (ref 3.8–10.5)
WBC UR QL: < 5 /HPF — SIGNIFICANT CHANGE UP

## 2022-10-10 PROCEDURE — 33208 INSRT HEART PM ATRIAL & VENT: CPT | Mod: KX

## 2022-10-10 PROCEDURE — 99223 1ST HOSP IP/OBS HIGH 75: CPT

## 2022-10-10 PROCEDURE — 99233 SBSQ HOSP IP/OBS HIGH 50: CPT | Mod: 25

## 2022-10-10 RX ORDER — SODIUM CHLORIDE 9 MG/ML
1000 INJECTION, SOLUTION INTRAVENOUS
Refills: 0 | Status: DISCONTINUED | OUTPATIENT
Start: 2022-10-10 | End: 2022-10-11

## 2022-10-10 RX ADMIN — SODIUM CHLORIDE 75 MILLILITER(S): 9 INJECTION, SOLUTION INTRAVENOUS at 18:49

## 2022-10-10 RX ADMIN — LORATADINE 10 MILLIGRAM(S): 10 TABLET ORAL at 22:47

## 2022-10-10 RX ADMIN — OXYCODONE AND ACETAMINOPHEN 1 TABLET(S): 5; 325 TABLET ORAL at 08:48

## 2022-10-10 RX ADMIN — TAMSULOSIN HYDROCHLORIDE 0.4 MILLIGRAM(S): 0.4 CAPSULE ORAL at 22:48

## 2022-10-10 RX ADMIN — Medication 2: at 22:51

## 2022-10-10 RX ADMIN — SIMVASTATIN 40 MILLIGRAM(S): 20 TABLET, FILM COATED ORAL at 22:48

## 2022-10-10 RX ADMIN — Medication 2: at 07:10

## 2022-10-10 RX ADMIN — Medication 300 MILLIGRAM(S): at 22:48

## 2022-10-10 RX ADMIN — OXYCODONE AND ACETAMINOPHEN 1 TABLET(S): 5; 325 TABLET ORAL at 09:48

## 2022-10-10 RX ADMIN — LOSARTAN POTASSIUM 50 MILLIGRAM(S): 100 TABLET, FILM COATED ORAL at 05:48

## 2022-10-10 NOTE — PROGRESS NOTE ADULT - PROBLEM SELECTOR PLAN 9
-Continue Tamsulosin 0.4mg QD    F: None  E: Replete if K<4 or Mag<2  N: DASH Diet  VTEppx: Xarelto   Dispo: cardiac tele -Continue Tamsulosin 0.4mg QD    F: None  E: Replete if K<4 or Mag<2  N: DASH Diet  VTEppx: Heparin gtt  Dispo: cardiac tele -Continue Tamsulosin 0.4mg QD    F: None  E: Replete if K<4 or Mag<2  N: DASH Diet  VTEppx: none   Dispo: cardiac tele

## 2022-10-10 NOTE — CONSULT NOTE ADULT - SUBJECTIVE AND OBJECTIVE BOX
HPI:  61 y/o male with HTN, HLD, DM II, obesity, paroxysmal Afib (on xarelto) and SVT w/aberrancy, presented to [facility] ER 10/8 s/p syncope without a prodrome.  EP called for further recommendations.    He denies any history of syncope.  No change in his usual state of health - except maybe a little more stressed recently secondary to work.  He ate and drank normally on Friday and states that he became lightheaded.  His usual symptoms when he has afib is lightheadedness, chest pressure and diaphoresis.  He did not have the chest pressure or diaphoresis but did have the same dizziness.  In the past he has had infrequent afib - overall burden <1% but he did have episodes up to 15 hours.  He is maintained on Xarelto and unfortunately his ILR is no longer in (was at [facility]).      He states he sat down after the lightheadedness and it went away but came back shortly later.  It then went away and he was doing his usual tasks.  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  He states he took down part of the office equipment he fell so hard - it was witnessed by his coworkers.  He went to [place] and had a head CT that showed a nasal fracture.  While there he states his heart rate went up to 200bpm and he was given extra metoprolol.   He left AMA secondary to not liking the care there and having to get his dog taken care of.  He came to [facility].  He reports a normal stress test and echo in the past 3 months with Dr. Horne.  NO prior syncope.  No palpitations chest pain, SOB, orthopnea, PND or edema.         PAST MEDICAL & SURGICAL HISTORY:  HTN (hypertension)  DM (diabetes mellitus)  Arthritis  High cholesterol  Paroxysmal atrial fibrillation  Gout  H/O spinal fusion  S/P TKR (total knee replacement), bilateral      FH: type 2 diabetes (Father)      Social History:no smoking, no drugs, no algohol    pertinent home medications:    Inpatient Medications:   allopurinol 300 milliGRAM(s) Oral at bedtime  insulin lispro (ADMELOG) corrective regimen sliding scale   SubCutaneous Before meals and at bedtime  loratadine 10 milliGRAM(s) Oral at bedtime  losartan 50 milliGRAM(s) Oral daily  oxycodone    5 mG/acetaminophen 325 mG 1 Tablet(s) Oral every 6 hours PRN  simvastatin 40 milliGRAM(s) Oral at bedtime  tamsulosin 0.4 milliGRAM(s) Oral at bedtime      Allergies:   Nuts (Rash)  penicillin (Unknown)  strawberry (Rash)      ROS:   CONSTITUTIONAL: No fever, weight loss    EYES: Pt denies  RESPIRATORY: No cough, wheezing, chills or hemoptysis; No Shortness of Breath  CARDIOVASCULAR: see HPI  GASTROINTESTINAL: Pt denies  NEUROLOGICAL: Pt denies  SKIN: Pt denies   PSYCHIATRIC: + anxiety  HEME/LYMPH: Pt denies    PHYSICAL:  T(C): 37.1 (10-10-22 @ 10:12), Max: 37.5 (10-10-22 @ 05:27)  HR: 88 (10-10-22 @ 11:38) (82 - 90)  BP: 133/70 (10-10-22 @ 11:38) (133/70 - 168/83)  RR: 18 (10-10-22 @ 11:38) (15 - 18)  SpO2: 97% (10-10-22 @ 11:38) (96% - 98%)     Appearance: No acute distress, well developed  Eyes: normal appearing conjunctiva, pupils and eyelids  Cardiovascular: Normal S1 S2, No JVD   Respiratory: Lungs clear to auscultation	  No wheeze, rhonchi, rales noted  Gastrointestinal:  Soft, NT/ND 	  Neurologic:  No deficit noted  Psych: A&Ox3, normal mood/affect  Musculoskeletal: no deformities noted  Skin: no rash noted, normal color and pigmentation.        LABS:                        12.8   6.64  )-----------( 276      ( 10 Oct 2022 05:30 )             39.6       136  |  98  |  14  ----------------------------<  175<H>  4.4   |  27  |  1.01    Ca    9.1      10 Oct 2022 05:30  Mg     1.8     10-10    TPro  7.3  /  Alb  4.3  /  TBili  1.8<H>  /  DBili  x   /  AST  26  /  ALT  22  /  AlkPhos  77  10-08    PT/INR - ( 08 Oct 2022 14:35 )   PT: 14.4 sec;   INR: 1.21          PTT - ( 08 Oct 2022 14:35 )  PTT:32.6 sec  TSH 0.84       EKG: all EKGs in chart NSR with narrow QRS    Telemetry: NSR with bursts of AT     ECHO: pending    Prior EP procedures: denies    Cath / stress / Cardiac CTa: as per patient had a stress test one month ago and was told it was normal with Dr. Horne     Assessment Plan:  61 y/o male with HTN, HLD, DM II, obesity, paroxysmal Afib (on xarelto) and SVT w/aberrancy, presented to [facility] ER 10/8 s/p syncope without a prodrome.  EP called for further recommendations.  From his symptoms and heart rate noted at OSH it sounds like he was in atrial fibrillation this past friday.  We discussed that this episode sounds cardiac in nature given no prodrome - however could be a conversion pause or sudden afib with RVR causing hypotension and then syncope.  We discussed without a monitor there is no way to know and that his telemetry here has shown short bursts of rapid rates but no bradycardia.  Options include addressing the atrial fibrillation with an event monitor and multaq and an outpatient afib ablation.  We discussed that if he did in fact have a conversion pause and continues to have them he could have more syncope (though if we saw any pauses / bradycardia we could give him a live monitor to be on top of this).  The other option is a pacemaker and starting multaq.  We discussed the limitation of this option is he could have fainted from rapid rates - and the pacemaker will only help with slow rates (which he may not have).  Also a pacemaker is permanent and if this is all secondary to afib treating his afib may obviate the need for a pacemaker completely.   He is EXTREMELY anxious of having more syncope and would like to do everything he can to prevent another episdoe.  We spoke about each option in detail and he would like to discuss it with his family.  Please keep him NPO for now.  Please get an echo or his outside echo to assure he has a normal ejection fraction          HPI:  63 y/o male with HTN, HLD, DM II, obesity, paroxysmal Afib (on xarelto) and SVT w/aberrancy, presented to [facility] ER 10/8 s/p syncope without a prodrome.  EP called for further recommendations.    He denies any history of syncope.  No change in his usual state of health - except maybe a little more stressed recently secondary to work.  He ate and drank normally on Friday and states that he became lightheaded.  His usual symptoms when he has afib is lightheadedness, chest pressure and diaphoresis.  He did not have the chest pressure or diaphoresis but did have the same dizziness.  In the past he has had infrequent afib - overall burden <1% but he did have episodes up to 15 hours.  He is maintained on Xarelto and unfortunately his ILR is no longer in (was at [facility]RT).      He states he sat down after the lightheadedness and it went away but came back shortly later.  It then went away and he was doing his usual tasks.  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  He states he took down part of the office equipment he fell so hard - it was witnessed by his coworkers.  He went to [place] and had a head CT that showed a nasal fracture.  While there he states his heart rate went up to 200bpm and he was given extra metoprolol.   He left AMA secondary to not liking the care there and having to get his dog taken care of.  He came to [facility].  He reports a normal stress test and echo in the past 3 months with Dr. Horne.  NO prior syncope.  No palpitations chest pain, SOB, orthopnea, PND or edema.         PAST MEDICAL & SURGICAL HISTORY:  HTN (hypertension)  DM (diabetes mellitus)  Arthritis  High cholesterol  Paroxysmal atrial fibrillation  Gout  H/O spinal fusion  S/P TKR (total knee replacement), bilateral      FH: type 2 diabetes (Father)      Social History:no smoking, no drugs, no algohol    pertinent home medications:    Inpatient Medications:   allopurinol 300 milliGRAM(s) Oral at bedtime  insulin lispro (ADMELOG) corrective regimen sliding scale   SubCutaneous Before meals and at bedtime  loratadine 10 milliGRAM(s) Oral at bedtime  losartan 50 milliGRAM(s) Oral daily  oxycodone    5 mG/acetaminophen 325 mG 1 Tablet(s) Oral every 6 hours PRN  simvastatin 40 milliGRAM(s) Oral at bedtime  tamsulosin 0.4 milliGRAM(s) Oral at bedtime      Allergies:   Nuts (Rash)  penicillin (Unknown)  strawberry (Rash)      ROS:   CONSTITUTIONAL: No fever, weight loss    EYES: Pt denies  RESPIRATORY: No cough, wheezing, chills or hemoptysis; No Shortness of Breath  CARDIOVASCULAR: see HPI  GASTROINTESTINAL: Pt denies  NEUROLOGICAL: Pt denies  SKIN: Pt denies   PSYCHIATRIC: + anxiety  HEME/LYMPH: Pt denies    PHYSICAL:  T(C): 37.1 (10-10-22 @ 10:12), Max: 37.5 (10-10-22 @ 05:27)  HR: 88 (10-10-22 @ 11:38) (82 - 90)  BP: 133/70 (10-10-22 @ 11:38) (133/70 - 168/83)  RR: 18 (10-10-22 @ 11:38) (15 - 18)  SpO2: 97% (10-10-22 @ 11:38) (96% - 98%)     Appearance: No acute distress, well developed  Eyes: normal appearing conjunctiva, pupils and eyelids  Cardiovascular: Normal S1 S2, No JVD   Respiratory: Lungs clear to auscultation	  No wheeze, rhonchi, rales noted  Gastrointestinal:  Soft, NT/ND 	  Neurologic:  No deficit noted  Psych: A&Ox3, normal mood/affect  Musculoskeletal: no deformities noted  Skin: no rash noted, normal color and pigmentation.        LABS:                        12.8   6.64  )-----------( 276      ( 10 Oct 2022 05:30 )             39.6       136  |  98  |  14  ----------------------------<  175<H>  4.4   |  27  |  1.01    Ca    9.1      10 Oct 2022 05:30  Mg     1.8     10-10    TPro  7.3  /  Alb  4.3  /  TBili  1.8<H>  /  DBili  x   /  AST  26  /  ALT  22  /  AlkPhos  77  10-08    PT/INR - ( 08 Oct 2022 14:35 )   PT: 14.4 sec;   INR: 1.21          PTT - ( 08 Oct 2022 14:35 )  PTT:32.6 sec  TSH 0.84       EKG: all EKGs in chart NSR with narrow QRS    Telemetry: NSR with bursts of AT     ECHO: pending    Prior EP procedures: denies    Cath / stress / Cardiac CTa: as per patient had a stress test one month ago and was told it was normal with Dr. Horne     Assessment Plan:  63 y/o male with HTN, HLD, DM II, obesity, paroxysmal Afib (on xarelto) and SVT w/aberrancy, presented to [facility] ER 10/8 s/p syncope without a prodrome.  EP called for further recommendations.  From his symptoms and heart rate noted at OSH it sounds like he was in atrial fibrillation this past friday.  We discussed that this episode sounds cardiac in nature given no prodrome - however could be a conversion pause or sudden afib with RVR causing hypotension and then syncope. In addition, with carotid massage, he becomes asystolic for the extent of massage.  We discussed without a monitor there is no way to know and that his telemetry here has shown short bursts of rapid rates but no bradycardia.  Options include addressing the atrial fibrillation with an event monitor and multaq and an outpatient afib ablation.  We discussed that if he did in fact have a conversion pause, or carotid sinus hypersensitivity and continues to have them he could have more syncope (though if we saw any pauses / bradycardia we could give him a live monitor to be on top of this).  The other option is a pacemaker and starting multaq.  We discussed the limitation of this option is he could have fainted from rapid rates - and the pacemaker will only help with slow rates (which he may not have).  Also a pacemaker is permanent and if this is all secondary to afib treating his afib may obviate the need for a pacemaker completely.   He is EXTREMELY anxious of having more syncope and would like to do everything he can to prevent another episdoe.  We spoke about each option in detail and he would like to discuss it with his family.  Please keep him NPO for now.  Please get an echo or his outside echo to assure he has a normal ejection fraction

## 2022-10-10 NOTE — PROGRESS NOTE ADULT - ASSESSMENT
62M w/ FHx CAD and PMHx HTN, HLD, h/o pre syncope s/p ILR 2/2018 revealing AFib and SVT w/ aberrancy (on Xarelto, s/p ILR removal 7/2022), DM-II, Gout and BPH, presented to [facility] ED 10/8 after a syncopal episode w/o prodrome, suffering nose laceration s/p repair @ [facility] (pt signed out AMA and requesting care @ [facility]), pt now admitted to cardiac telemetry for further syncope w/u. EP consulted for possible PPM. 62M w/ FHx CAD and PMHx HTN, HLD, h/o pre syncope s/p ILR 2/2018 revealing AFib and SVT w/ aberrancy (on Xarelto, s/p ILR removal 7/2022), DM-II, Gout and BPH, presented to [facility] ED 10/8 after a syncopal episode w/o prodrome, suffering nose laceration s/p repair @ [facility] (pt signed out AMA and requesting care @ [facility]), pt now admitted to cardiac telemetry for further syncope w/u. 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 62M w/ FHx CAD and PMHx HTN, HLD, h/o pre syncope s/p ILR 2/2018 revealing AFib and SVT w/ aberrancy (on Xarelto, s/p ILR removal 7/2022), DM-II, Gout and BPH, presented to [facility] ED 10/8 after a syncopal episode w/o prodrome, suffering nose laceration s/p repair @ [facility] (pt signed out AMA and requesting care @ [facility]), pt now admitted to cardiac telemetry for further syncope w/u. EP consulted, plan for PPM today.

## 2022-10-10 NOTE — DISCHARGE NOTE PROVIDER - CARE PROVIDERS DIRECT ADDRESSES
,[email or web address]

## 2022-10-10 NOTE — DISCHARGE NOTE PROVIDER - PROVIDER TOKENS
PROVIDER:[TOKEN:[[id number]],SCHEDULEDAPPT:[11/15/2022],SCHEDULEDAPPTTIME:[10:20 AM],ESTABLISHEDPATIENT:[T]],PROVIDER:[TOKEN:[[id number]],SCHEDULEDAPPT:[10/18/2022],SCHEDULEDAPPTTIME:[02:30 PM]]

## 2022-10-10 NOTE — DISCHARGE NOTE PROVIDER - NSDCCPCAREPLAN_GEN_ALL_CORE_FT
PRINCIPAL DISCHARGE DIAGNOSIS  Diagnosis: Syncope  Assessment and Plan of Treatment: You presented to hospital after having a syncopal episode at work. Although there were no arrhthmias seen on the heart monitor or EKG, it is most likely you passed out due to a conversion pause in your heart given your history of atrial fibrillation.  You had a pacemaker implanted in your chest on 10/10/22 to prevent any future conversion pause or arrythmia.  - Avoid strenuous activity or heavy lifting anything more than 10lbs for the next five days  - Please keep your incision site dry for 5 days then you may shower and pat it dry. Do not scrub off glue, it will fall off on its own.  - For 4-6 weeks DO NOT lift your arm higher than your shoulder on the Left side  - For any bleeding or hematoma formation (hardened blood collection under the skin) at the access site please hold pressure and go to the emergency room  - Please follow up with  ___in 1 month.   If you experience any dizziness, lightheadedness, chest pain, shortness of breath, palpitations, or if you feel a shock from your device in the chest, please go to the nearest emergency room and call your cardiologist      SECONDARY DISCHARGE DIAGNOSES  Diagnosis: Head injury  Assessment and Plan of Treatment: You suffered a laceration to your nose which was sutured at [facility] on 10/7/22. You must have the sutures removed within 1 week. Please go to your primary care doctor, urgent care or emergency room to have the sutures removed. You can continue to take Tylenol extra strenght every 6 hours as needed for pain.     PRINCIPAL DISCHARGE DIAGNOSIS  Diagnosis: Syncope  Assessment and Plan of Treatment: You presented to hospital after having a syncopal episode at work. Although there were no arhythmias seen on the heart monitor or EKG, it is most likely you passed out due to a conversion pause in the setting of your history of atrial fibrillation.  You had a pacemaker implanted in your chest on 10/10/22 to prevent any future conversion pause or arhythmia.  - Avoid strenuous activity or heavy lifting anything more than 10lbs for the next five days  - Please keep your incision site dry for 5 days then you may shower and pat it dry. Do not scrub off glue, it will fall off on its own.  - For 4-6 weeks DO NOT lift your arm higher than your shoulder on the Left side  - For any bleeding or hematoma formation (hardened blood collection under the skin) at the access site please hold pressure and go to the emergency room  - Please follow up with Dr. Nunes on 11/15/22 at 10:20am.  If you experience any dizziness, lightheadedness, chest pain, shortness of breath, palpitations, or if you feel a shock from your device in the chest, please go to the nearest emergency room and call your cardiologist      SECONDARY DISCHARGE DIAGNOSES  Diagnosis: Head injury  Assessment and Plan of Treatment: You suffered nose fracture and a laceration which was sutured at [facility] on 10/7/22. The sutures were removed on 10/12 prior to you being discharged from the hospital.   -Please follow up with the oral maxillofacial surgeon Dr. Gray on 10/18/22 at 2:30pm for further management.  -You can continue to take Tylenol extra strenght every 6 hours as needed for pain.    Diagnosis: Paroxysmal atrial fibrillation  Assessment and Plan of Treatment: You have an abnormal heart rhythm (arrhythmia) called atrial fibrillation.   -Please RESTART Xarelto on 10/13/22 to prevent a stroke.  -Please CONTINUE Metoprolol XL 25mg daily to keep your heart rate regular  -Please follow up with Dr. Nunes on 11/15/22 at 10:20am.    Diagnosis: HTN (hypertension)  Assessment and Plan of Treatment: Please continue Losartan 50mg daily as listed to keep your blood pressure controlled. For blood pressure that is too high or too low please see your doctor or go to the emergency room as necessary.    Diagnosis: HLD (hyperlipidemia)  Assessment and Plan of Treatment: Please continue ____ at bedtime to keep your cholesterol low. High cholesterol contributes to heart disease.    Diagnosis: DM (diabetes mellitus)  Assessment and Plan of Treatment: Your Hemoglobin A1c is 6.8% and your goal A1c is less than 7.0%. This number measures your average blood sugar level over the last three months.  Please continue Metformin 1000mg and trulicity as listed for diabetes. Maintain a low carbohydrate, low sugar diet, exercise, monitor your fingerstick blood sugars regarly and follow up with your Endocrinologist/Primary Care Doctor.    Diagnosis: Gout  Assessment and Plan of Treatment: Please continue Allopurinol 300mg daily as prescribed    Diagnosis: BPH (benign prostatic hyperplasia)  Assessment and Plan of Treatment: Please continue Tamsulosin 0.4mg daily as prescribed     PRINCIPAL DISCHARGE DIAGNOSIS  Diagnosis: Syncope  Assessment and Plan of Treatment: You presented to hospital after having a syncopal episode at work. Although there were no arhythmias seen on the heart monitor or EKG, it is most likely you passed out due to a conversion pause in the setting of your history of atrial fibrillation.  You had a pacemaker implanted in your chest on 10/10/22 to prevent any future conversion pause or arhythmia.  - Avoid strenuous activity or heavy lifting anything more than 10lbs for the next five days  - Please keep your incision site dry for 5 days then you may shower and pat it dry. Do not scrub off glue, it will fall off on its own.  - For 4-6 weeks DO NOT lift your arm higher than your shoulder on the Left side  - For any bleeding or hematoma formation (hardened blood collection under the skin) at the access site please hold pressure and go to the emergency room  - Please follow up with Dr. Nunes on 11/15/22 at 10:20am.  If you experience any dizziness, lightheadedness, chest pain, shortness of breath, palpitations, or if you feel a shock from your device in the chest, please go to the nearest emergency room and call your cardiologist      SECONDARY DISCHARGE DIAGNOSES  Diagnosis: Head injury  Assessment and Plan of Treatment: You suffered nose fracture and a laceration which was sutured at [facility] on 10/7/22. The sutures were removed on 10/12 prior to you being discharged from the hospital.   -Please follow up with the oral maxillofacial surgeon Dr. Gray on 10/18/22 at 2:30pm for further management.  -You can continue to take Tylenol extra strenght every 6 hours as needed for pain.    Diagnosis: Paroxysmal atrial fibrillation  Assessment and Plan of Treatment: You have an abnormal heart rhythm (arrhythmia) called atrial fibrillation.   -Please RESTART Xarelto on 10/13/22 to prevent a stroke.  -Please CONTINUE Metoprolol XL 25mg daily to keep your heart rate regular  -Please follow up with Dr. Nunes on 11/15/22 at 10:20am.    Diagnosis: HTN (hypertension)  Assessment and Plan of Treatment: Please continue Losartan 50mg daily as listed to keep your blood pressure controlled. For blood pressure that is too high or too low please see your doctor or go to the emergency room as necessary.    Diagnosis: HLD (hyperlipidemia)  Assessment and Plan of Treatment: Please continue Simvastatin 40mg daily at bedtime to keep your cholesterol low. High cholesterol contributes to heart disease.    Diagnosis: DM (diabetes mellitus)  Assessment and Plan of Treatment: Your Hemoglobin A1c is 6.8% and your goal A1c is less than 7.0%. This number measures your average blood sugar level over the last three months.  Please continue Metformin 1000mg and trulicity as listed for diabetes. Maintain a low carbohydrate, low sugar diet, exercise, monitor your fingerstick blood sugars regarly and follow up with your Endocrinologist/Primary Care Doctor.    Diagnosis: Gout  Assessment and Plan of Treatment: Please continue Allopurinol 300mg daily as prescribed    Diagnosis: BPH (benign prostatic hyperplasia)  Assessment and Plan of Treatment: Please continue Tamsulosin 0.4mg daily as prescribed

## 2022-10-10 NOTE — DISCHARGE NOTE PROVIDER - NSDCMRMEDTOKEN_GEN_ALL_CORE_FT
allopurinol 300 mg oral tablet: 1 tab(s) orally once a day (at bedtime)  levocetirizine 5 mg oral tablet: 1 tab(s) orally once a day (at bedtime)  losartan 50 mg oral tablet: 1 tab(s) orally once a day  metFORMIN 500 mg oral tablet: 2 tab(s) orally 2 times a day  metoprolol succinate 50 mg oral tablet, extended release: 0.5 tab(s) orally 2 times a day  simvastatin 40 mg oral tablet: 1 tab(s) orally once a day (at bedtime)  tamsulosin 0.4 mg oral capsule: 1 cap(s) orally once a day (at bedtime)  Trulicity Pen 0.75 mg/0.5 mL subcutaneous solution: 1 dose(s) subcutaneous once a week  Xarelto 20 mg oral tablet: 1 tab(s) orally once a day (in the morning)   allopurinol 300 mg oral tablet: 1 tab(s) orally once a day (at bedtime)  levocetirizine 5 mg oral tablet: 1 tab(s) orally once a day (at bedtime)  losartan 50 mg oral tablet: 1 tab(s) orally once a day  metFORMIN 500 mg oral tablet: 2 tab(s) orally 2 times a day  metoprolol succinate 50 mg oral tablet, extended release: 0.5 tab(s) orally 2 times a day  simvastatin 40 mg oral tablet: 1 tab(s) orally once a day (at bedtime)  tamsulosin 0.4 mg oral capsule: 1 cap(s) orally once a day (at bedtime)  Trulicity Pen 0.75 mg/0.5 mL subcutaneous solution: 1 dose(s) subcutaneous once a week  Xarelto 20 mg oral tablet: 1 tab(s) orally once a day (in the morning)    RESTART 10/13/22

## 2022-10-10 NOTE — PROGRESS NOTE ADULT - PROBLEM SELECTOR PLAN 2
pt suffered laceration of nose after LOC, s/p nasal sutures placed at [facility] 10/7  -continue Tylenol 650mg Q6 PRN pain  -suture removal in 1 week pt suffered laceration of nose after LOC, s/p nasal sutures placed at [facility] on 10/7  -continue Oxycodone 5/325mg Q6 PRN  -suture removal in 1 week  -Pt reports normal CT at [place]

## 2022-10-10 NOTE — CONSULT NOTE ADULT - NS ATTEND AMEND GEN_ALL_CORE FT
[place] gentleman well known to Dr. Nunes with paroxysmal atrial fibrillation. His most recent event is unlike other presentations, with the abrupt onset of syncope without any prodrome of symptoms. He has suffered extensive trauma to his face including a broken nose. We discussed multiple treatment options and given his fear of recurrent syncope have recommended a dual chamber pacemaker. He is agreeable to this given the possiblility of carotid sinus hypersensitivity or offset pauses.

## 2022-10-10 NOTE — PROGRESS NOTE ADULT - PROBLEM SELECTOR PLAN 3
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

## 2022-10-10 NOTE — DISCHARGE NOTE PROVIDER - HOSPITAL COURSE
***INCOMPLETE***    62M w/ FHx CAD and PMHx HTN, HLD, h/o pre syncope s/p ILR 2/2018 revealing AFib and SVT w/ aberrancy (on Xarelto, s/p ILR removal 7/2022), DM-II, Gout and BPH, presented to [facility] ED 10/8 after a syncopal episode w/o prodrome, suffering nose laceration s/p repair @ [facility] (pt signed out AMA and requesting care @ [facility]), pt now admitted to cardiac telemetry for further syncope w/u. EP consulted, plan for PPM today.     Problem/Plan - 1:  ·  Problem: Syncope.   ·  Plan: presents w/ sudden LOC w/o prodrome while standing at work, pt endorses intermittent lightheadedness prior in the day; pt currently asymptomatic and HD stable  -orthostatics negative, VSS, no events on telemetry, and EKG non ischemic  -TTE 7/30/22: normal LVEF, mild LA enlargement, no valvular disease. 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       Problem/Plan - 2:  ·  Problem: Laceration of nose.   ·  Plan: pt suffered laceration of nose after LOC, s/p nasal sutures placed at [facility] on 10/7  -continue Oxycodone 5/325mg Q6 PRN  -suture removal in 1 week  -Pt reports normal CT at [place].     Problem/Plan - 3:  ·  Problem: Paroxysmal atrial fibrillation.   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       Problem/Plan - 4:  ·  Problem: HTN (hypertension).   ·  Plan: SBP stable  -Continue Losartan 50mg QD.     Problem/Plan - 5:  ·  Problem: Hyperlipidemia.   ·  Plan: Lipids WNL  -Continue Simvastatin 40mg QHS.     Problem/Plan - 6:  ·  Problem: Type 2 diabetes mellitus.   ·  Plan: A1c 6.8%  -Continue mISS and FS QID  -Holding Metformin 1000mg and Trulicity.     Problem/Plan - 7:  ·  Problem: Mild renal insufficiency.   ·  Plan: IMPROVED  -Cr 1.35 on admit, now wnl.     Problem/Plan - 8:  ·  Problem: Gout.   ·  Plan: -Continue Allopurinol 300mg QD.     Problem/Plan - 9:  ·  Problem: BPH (benign prostatic hyperplasia).   ·  Plan: -Continue Tamsulosin 0.4mg QD    F: None  E: Replete if K<4 or Mag<2  N: DASH Diet  VTEppx: none   Dispo: cardiac tele.   ***INCOMPLETE***    62M w/ FHx CAD and PMHx HTN, HLD, h/o pre syncope s/p ILR 2/2018 revealing AFib and SVT w/ aberrancy (on Xarelto, s/p ILR removal 7/2022), DM-II, Gout and BPH, presented to [facility] ED 10/8 after a syncopal episode w/o prodrome, suffering nose laceration s/p repair @ [facility] (pt signed out AMA and requesting care @ [facility]), pt now admitted to cardiac telemetry for further syncope w/u. EP consulted, plan for PPM today. Telemetry monitoring showed NSR with bursts of AT . Echo on 7/30/22 showed normal LVEF, mild LA enlargement, and no valvular disease. NST from 9/17/22 showed normal myocardial perfusion with LVEF 43%. EP was consulted and placed a pacemaker on 10/10. Post procedure CXR shows good lead placement and no pneumothorax. PPM interrogation successful with underlying sinus rhythm. Patient instructed on proper home care and will resume Xarelto on 10/13.   	The syncopal event led to a nasal fracture and a laceration requiring sutures. Sutures to be removed 10/12 prior to discharge. OMFS consulted and outpatient follow up appointment scheduled for 10/18 with Dr. Gray. Patient reports facial pain and left sided pain from the fall which has been controlled with oxycodone 5/325mg Q6 PRN.  	Patient has history of paroxysmal atrial fibrillation but remained in NSR during hospital stay. Toprol continued 25 mg BID and Xarelto resumed on 10/13. Hypertension remained stable throughout hospital stay controlled with losartan 50 mg QD PO. Hyperlipidemia controlled throughout hospital stay with Simvastatin 40 mg QHS. Patient has history of diabetes mellitus type 2 with A1C at 6.8. Metformin and Trulicity held during stay to be resumed upon discharge. Mild renal insufficiency improved throughout hospital stay with creatinine now WNL. Gout controlled with allopurinol 300 mg QD throughout hospital stay. BPH managed with tamsulosin .4 mg QD. Patient maintained DASH diet during stay.          ***INCOMPLETE***    62M w/ FHx CAD and PMHx HTN, HLD, h/o pre syncope s/p ILR 2/2018 revealing AFib and SVT w/ aberrancy (on Xarelto, s/p ILR removal 7/2022), DM-II, Gout and BPH, initially presented to [facility] 10/7 after a syncopal episode w/o prodrome at work, suffering nose laceration/fracture, s/p suture repair, pt signed out AMA and requested care at [facility] instead. In ED, pt asymptomatic and HD stable. He endorses 2 episodes of dizziness hours prior to syncopal episode.     resents w/ sudden LOC w/o prodrome while standing at work, pt endorses intermittent lightheadedness prior in the day  pt is now admitted to cardiac telemetry for further syncope w/u. EP consulted and pt now s/p PPM placement 10/10/22.                EP consulted, plan for PPM today. Telemetry monitoring showed NSR with bursts of AT . Echo on 7/30/22 showed normal LVEF, mild LA enlargement, and no valvular disease. NST from 9/17/22 showed normal myocardial perfusion with LVEF 43%. EP was consulted and placed a pacemaker on 10/10. Post procedure CXR shows good lead placement and no pneumothorax. PPM interrogation successful with underlying sinus rhythm. Patient instructed on proper home care and will resume Xarelto on 10/13.   	The syncopal event led to a nasal fracture and a laceration requiring sutures. Sutures to be removed 10/12 prior to discharge. OMFS consulted and outpatient follow up appointment scheduled for 10/18 with Dr. Gray. Patient reports facial pain and left sided pain from the fall which has been controlled with oxycodone 5/325mg Q6 PRN.  	Patient has history of paroxysmal atrial fibrillation but remained in NSR during hospital stay. Toprol continued 25 mg BID and Xarelto resumed on 10/13. Hypertension remained stable throughout hospital stay controlled with losartan 50 mg QD PO. Hyperlipidemia controlled throughout hospital stay with Simvastatin 40 mg QHS. Patient has history of diabetes mellitus type 2 with A1C at 6.8. Metformin and Trulicity held during stay to be resumed upon discharge. Mild renal insufficiency improved throughout hospital stay with creatinine now WNL. Gout controlled with allopurinol 300 mg QD throughout hospital stay. BPH managed with tamsulosin .4 mg QD. Patient maintained DASH diet during stay.          ***INCOMPLETE***    62M w/ FHx CAD and PMHx HTN, HLD, h/o pre syncope s/p ILR 2/2018 revealing AFib and SVT w/ aberrancy (on Xarelto, s/p ILR removal 7/2022), DM-II, Gout and BPH, initially presented to [facility] 10/7 after a syncopal episode w/o prodrome at work, suffering nose laceration/fracture, s/p suture repair and pt signed out AMA, requested care at [facility] instead. Pt presented to [facility] ED on 10/8 asymptomatic, VSS, lab unremarkable, EKG NSR, non ischemic, and pt admitted to cardiac telemetry for further syncope workup.  Pt remained asymptomatic and HD stable, EKG remained unchanged and no events on telemetry. EP consulted for possible conversion pause given h/o Afib and when pt presented to [facility], his HR was in 200s. Pt now s/p PPM placement 10/10/22, normal function on interrogation. Pt instructed to restart home Xarelto for Afib on 10/13/22. Collaterals obtained from outpt PMD, TTE 7/30/22 revealed normal LVEF w/o valvular disease and NST 9/17/22 revealed normal myocardial perfusion. OMFS consulted for further management of face trauma, no further intervention needed at this time and pt is to f/u w/ Dr. Gray within 1 week. Sutures that were initially placed at [place] on 10/7 were removed on 10/12 prior to d/c.  Pt seen and examined at bedside this AM without any complaints or events overnight, VSS, labs and telemetry reviewed and pt stable for discharge as discussed with Dr. Don. Pt has received appropriate discharge instructions, including medication regimen, access site management and follow up with Dr. Nunes 11/15 and Dr. Gray 10/18.    Discharge medications:  ______________. 61 yo M w/ FHx CAD and PMHx HTN, HLD, h/o pre syncope s/p ILR 2/2018 revealing AFib and SVT w/ aberrancy (on Xarelto, s/p ILR removal 7/2022), DM-II, Gout and BPH, initially presented to [facility] 10/7 after a syncopal episode w/o prodrome at work, suffering nose laceration/fracture, s/p suture repair and pt signed out AMA, requested care at [facility] instead. Pt presented to [facility] ED on 10/8 asymptomatic, VSS, lab unremarkable, EKG NSR, non ischemic, and pt admitted to cardiac telemetry for further syncope workup.    Pt remained asymptomatic and HD stable, EKG remained unchanged and no events on telemetry. EP consulted for possible conversion pause given h/o Afib and when pt presented to [facility], his HR was in 200s. Pt now s/p PPM placement 10/10/22, normal function on interrogation. Pt instructed to restart home Xarelto for Afib on 10/13/22. Collaterals obtained from outpt PMD, TTE 7/30/22 revealed normal LVEF w/o valvular disease and NST 9/17/22 revealed normal myocardial perfusion. OMFS consulted for further management of face trauma, no further intervention needed at this time and pt is to f/u w/ Dr. Gray within 1 week. Sutures that were initially placed at [place] on 10/7 were removed on 10/12 prior to d/c.    Pt seen and examined at bedside this AM without any complaints or events overnight, VSS, labs and telemetry reviewed and pt stable for discharge as discussed with Dr. Don. Pt has received appropriate discharge instructions, including medication regimen, access site management and follow up with Dr. Nunes 11/15 and Dr. Gray 10/18.    Discharge medications: Losartan 50mg QD, Toprol XL 25gmg QD, Simvastatin 40mg QD    Reasons for No Cardiac Rehab Referral Rx (Must select 1 or more options):                Patient Refused            Medical Reason: ex needs Home Care, Home PT            Patient lacks medical coverage for Cardiac Rehab            Pt discharged to Nursing Care/[facility]/Long term Care Facility            Patient Lacks Transportation or no cardiac rehab within 60 minutes driving range            Patient already participates in Cardiac Rehab            Other: (provide details) Non-qualifying dx

## 2022-10-10 NOTE — DISCHARGE NOTE PROVIDER - CARE PROVIDER_API CALL
Raf Nunes (MD)  Cardiac Electrophysiology; Cardiovascular Disease  [address] [place] [address]  Phone: [phone]  Fax: [phone]  Established Patient  Scheduled Appointment: 11/15/2022 10:20 AM    Jonh GrayDDS; MD)  OralMaxillofacial Surgery  [address] [place] [address]  Phone: [phone]  Fax: [phone]  Scheduled Appointment: 10/18/2022 02:30 PM

## 2022-10-10 NOTE — DISCHARGE NOTE PROVIDER - NSDCFUADDAPPT_GEN_ALL_CORE_FT
Jonh Gray  [facility]  [address]  Scheduled Appointment: 10/18/2022 2:30pm

## 2022-10-10 NOTE — PROGRESS NOTE ADULT - SUBJECTIVE AND OBJECTIVE BOX
Cardiology PA Adult Progress Note    SUBJECTIVE ASSESSMENT:  	  MEDICATIONS:  losartan 50 milliGRAM(s) Oral daily  tamsulosin 0.4 milliGRAM(s) Oral at bedtime  loratadine 10 milliGRAM(s) Oral at bedtime  oxycodone    5 mG/acetaminophen 325 mG 1 Tablet(s) Oral every 6 hours PRN  allopurinol 300 milliGRAM(s) Oral at bedtime  insulin lispro (ADMELOG) corrective regimen sliding scale   SubCutaneous Before meals and at bedtime  simvastatin 40 milliGRAM(s) Oral at bedtime  rivaroxaban 20 milliGRAM(s) Oral <User Schedule>    VITAL SIGNS:  T(C): 37.5 (10-10-22 @ 05:27), Max: 37.5 (10-10-22 @ 05:27)  HR: 90 (10-10-22 @ 05:46) (80 - 90)  BP: 150/70 (10-10-22 @ 05:46) (137/67 - 168/83)  RR: 16 (10-10-22 @ 05:46) (15 - 17)  SpO2: 98% (10-10-22 @ 05:46) (96% - 98%)    I&O's Summary  09 Oct 2022 07:01  -  10 Oct 2022 07:00  --------------------------------------------------------  IN: 240 mL / OUT: 475 mL / NET: -235 mL                                    PHYSICAL EXAM:  Appearance: Normal	  HEENT: Normal oral mucosa, PERRL, EOMI	  Neck: Supple, + JVD/ - JVD; ___ Carotid Bruit   Cardiovascular: Normal S1 S2, No murmurs  Respiratory: Lungs clear to auscultation/Decreased Breath Sounds/No Rales, Rhonchi, Wheezing	  Gastrointestinal:  Soft, Non-tender, + BS	  Skin: No rashes, No ecchymoses, No cyanosis  Extremities: Normal range of motion, No clubbing, cyanosis or edema  Vascular: Peripheral pulses palpable 2+ bilaterally  Neurologic: Non-focal  Psychiatry: A & O x 3, Mood & affect appropriate    LABS:	 	                      12.8   6.64  )-----------( 276      ( 10 Oct 2022 05:30 )             39.6     10-10    136  |  98  |  14  ----------------------------<  175<H>  4.4   |  27  |  1.01    Ca    9.1      10 Oct 2022 05:30  Mg     1.8     10-10    TPro  7.3  /  Alb  4.3  /  TBili  1.8<H>  /  DBili  x   /  AST  26  /  ALT  22  /  AlkPhos  77  10-08    PT/INR - ( 08 Oct 2022 14:35 )   PT: 14.4 sec;   INR: 1.21          PTT - ( 08 Oct 2022 14:35 )  PTT:32.6 sec Cardiology PA Adult Progress Note    SUBJECTIVE ASSESSMENT: Pt seen and examined at bedside this AM laying comfortable in bed w/o any complaints or events overnight. He states that he feels better and has not experienced any lightheadedness or dizziness while in the hospital. He also denies any CP, palpitations, SOB, orthopnea, HA, N/V or abd pain.  	  MEDICATIONS:  losartan 50 milliGRAM(s) Oral daily  tamsulosin 0.4 milliGRAM(s) Oral at bedtime  loratadine 10 milliGRAM(s) Oral at bedtime  oxycodone    5 mG/acetaminophen 325 mG 1 Tablet(s) Oral every 6 hours PRN  allopurinol 300 milliGRAM(s) Oral at bedtime  insulin lispro (ADMELOG) corrective regimen sliding scale   SubCutaneous Before meals and at bedtime  simvastatin 40 milliGRAM(s) Oral at bedtime  rivaroxaban 20 milliGRAM(s) Oral <User Schedule>    VITAL SIGNS:  T(C): 37.5 (10-10-22 @ 05:27), Max: 37.5 (10-10-22 @ 05:27)  HR: 90 (10-10-22 @ 05:46) (80 - 90)  BP: 150/70 (10-10-22 @ 05:46) (137/67 - 168/83)  RR: 16 (10-10-22 @ 05:46) (15 - 17)  SpO2: 98% (10-10-22 @ 05:46) (96% - 98%)    I&O's Summary  09 Oct 2022 07:01  -  10 Oct 2022 07:00  --------------------------------------------------------  IN: 240 mL / OUT: 475 mL / NET: -235 mL                                    PHYSICAL EXAM:  Appearance: Normal	  HEENT: Normal oral mucosa, PERRL, EOMI	  Neck: Supple,- JVD; no Carotid Bruit   Cardiovascular: Normal S1 S2, No murmurs  Respiratory: Lungs clear to auscultation, No Rales, Rhonchi, Wheezing	  Gastrointestinal:  Soft, Non-tender, + BS	  Skin: No rashes, No ecchymoses, No cyanosis  Extremities: Normal range of motion, No clubbing, cyanosis or edema  Vascular: Peripheral pulses palpable 2+ bilaterally  Neurologic: Non-focal  Psychiatry: A & O x 3, Mood & affect appropriate    LABS:	 	                      12.8   6.64  )-----------( 276      ( 10 Oct 2022 05:30 )             39.6     10-10    136  |  98  |  14  ----------------------------<  175<H>  4.4   |  27  |  1.01    Ca    9.1      10 Oct 2022 05:30  Mg     1.8     10-10    TPro  7.3  /  Alb  4.3  /  TBili  1.8<H>  /  DBili  x   /  AST  26  /  ALT  22  /  AlkPhos  77  10-08    PT/INR - ( 08 Oct 2022 14:35 )   PT: 14.4 sec;   INR: 1.21          PTT - ( 08 Oct 2022 14:35 )  PTT:32.6 sec Cardiology PA Adult Progress Note    SUBJECTIVE ASSESSMENT: Pt seen and examined at bedside this AM laying comfortable in bed w/o any complaints or events overnight. He states that he feels better and has not experienced any lightheadedness or dizziness while in the hospital. He also denies any CP, palpitations, SOB, orthopnea, HA, N/V or abd pain.  	  MEDICATIONS:  losartan 50 milliGRAM(s) Oral daily  tamsulosin 0.4 milliGRAM(s) Oral at bedtime  loratadine 10 milliGRAM(s) Oral at bedtime  oxycodone    5 mG/acetaminophen 325 mG 1 Tablet(s) Oral every 6 hours PRN  allopurinol 300 milliGRAM(s) Oral at bedtime  insulin lispro (ADMELOG) corrective regimen sliding scale   SubCutaneous Before meals and at bedtime  simvastatin 40 milliGRAM(s) Oral at bedtime  rivaroxaban 20 milliGRAM(s) Oral <User Schedule>    VITAL SIGNS:  T(C): 37.5 (10-10-22 @ 05:27), Max: 37.5 (10-10-22 @ 05:27)  HR: 90 (10-10-22 @ 05:46) (80 - 90)  BP: 150/70 (10-10-22 @ 05:46) (137/67 - 168/83)  RR: 16 (10-10-22 @ 05:46) (15 - 17)  SpO2: 98% (10-10-22 @ 05:46) (96% - 98%)    I&O's Summary  09 Oct 2022 07:01  -  10 Oct 2022 07:00  --------------------------------------------------------  IN: 240 mL / OUT: 475 mL / NET: -235 mL                                    PHYSICAL EXAM:  Appearance: Normal	  HEENT: Normal oral mucosa, PERRL, EOMI	  Neck: Supple,- JVD; no Carotid Bruit   Cardiovascular: Normal S1 S2, No murmurs  Respiratory: Lungs clear to auscultation, No Rales, Rhonchi, Wheezing	  Gastrointestinal:  Soft, Non-tender, + BS	  Skin: No rashes, No ecchymoses, No cyanosis  Extremities: Normal range of motion, No clubbing, cyanosis or edema  Vascular: Peripheral pulses palpable 2+ bilaterally  Neurologic: Non-focal  Psychiatry: A & O x 3, Mood & affect appropriate    LABS:	 	                      12.8   6.64  )-----------( 276      ( 10 Oct 2022 05:30 )             39.6     10-10    136  |  98  |  14  ----------------------------<  175<H>  4.4   |  27  |  1.01    Ca    9.1      10 Oct 2022 05:30  Mg     1.8     10-10    TPro  7.3  /  Alb  4.3  /  TBili  1.8<H>  /  DBili  x   /  AST  26  /  ALT  22  /  AlkPhos  77  10-08    PT/INR - ( 08 Oct 2022 14:35 )   PT: 14.4 sec;   INR: 1.21       PTT - ( 08 Oct 2022 14:35 )  PTT:32.6 sec

## 2022-10-10 NOTE — PROGRESS NOTE ADULT - PROBLEM SELECTOR PLAN 1
presents w/ sudden LOC w/o prodrome while standing at work, pt endorses intermittent lightheadedness prior in the day; pt currently asymptomatic and HD stable  -h/o presyncope and s/p ILR revealing SVT w/ aberrancy and AF, now s/p ILR removal 7/2022  -orthostatics negative and VSS  -Trop neg x 1, EKG non ischemic and no events on telemetry  -TTE 10/2017: borderline LVH, normal LVEF w/o WMA, no significant valvular disease. 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

## 2022-10-10 NOTE — DISCHARGE NOTE PROVIDER - NSDCFUSCHEDAPPT_GEN_ALL_CORE_FT
Raf Nunes  [facility] Physician [facility]  [facility] [address]  Scheduled Appointment: 11/15/2022

## 2022-10-10 NOTE — CHART NOTE - NSCHARTNOTEFT_GEN_A_CORE
LAURA MENDIETA  [id number]    PROCEDURE:    dual chamber pacemaker implantation  Axillary vein access x2       INDICATION:  syncope with facial trauma   sinus node dysfunction   carotid hypersensitivity syndrome       ELECTROPHYSIOLOGIST(S):  Dr. Ogden   [place] [facility]     ANESTHESIOLOGY:  MAC     FINDINGS:    RV lead implanted on the RV septum   RA lead implanted in the HRA in the appendage  excellent impedance, sensing and pacing thresholds   programmed DDD        COMPLICATIONS:    None     RECOMMENDATIONS:    chest xray pa and lateral view   resume rivaroxaban 20 mg on 10/13/2022  no heparin/lovenox  ekg  Device interrogation in the am

## 2022-10-11 PROBLEM — M10.9 GOUT, UNSPECIFIED: Chronic | Status: ACTIVE | Noted: 2022-10-08

## 2022-10-11 PROBLEM — I48.0 PAROXYSMAL ATRIAL FIBRILLATION: Chronic | Status: ACTIVE | Noted: 2022-10-08

## 2022-10-11 LAB
ALBUMIN SERPL ELPH-MCNC: 3.7 G/DL — SIGNIFICANT CHANGE UP (ref 3.3–5)
ALP SERPL-CCNC: 67 U/L — SIGNIFICANT CHANGE UP (ref 40–120)
ALT FLD-CCNC: 20 U/L — SIGNIFICANT CHANGE UP (ref 10–45)
ANION GAP SERPL CALC-SCNC: 13 MMOL/L — SIGNIFICANT CHANGE UP (ref 5–17)
AST SERPL-CCNC: 28 U/L — SIGNIFICANT CHANGE UP (ref 10–40)
BILIRUB SERPL-MCNC: 1.5 MG/DL — HIGH (ref 0.2–1.2)
BUN SERPL-MCNC: 13 MG/DL — SIGNIFICANT CHANGE UP (ref 7–23)
CALCIUM SERPL-MCNC: 9 MG/DL — SIGNIFICANT CHANGE UP (ref 8.4–10.5)
CHLORIDE SERPL-SCNC: 100 MMOL/L — SIGNIFICANT CHANGE UP (ref 96–108)
CO2 SERPL-SCNC: 24 MMOL/L — SIGNIFICANT CHANGE UP (ref 22–31)
CREAT SERPL-MCNC: 0.93 MG/DL — SIGNIFICANT CHANGE UP (ref 0.5–1.3)
EGFR: 92 ML/MIN/1.73M2 — SIGNIFICANT CHANGE UP
GLUCOSE BLDC GLUCOMTR-MCNC: 175 MG/DL — HIGH (ref 70–99)
GLUCOSE BLDC GLUCOMTR-MCNC: 180 MG/DL — HIGH (ref 70–99)
GLUCOSE BLDC GLUCOMTR-MCNC: 226 MG/DL — HIGH (ref 70–99)
GLUCOSE BLDC GLUCOMTR-MCNC: 237 MG/DL — HIGH (ref 70–99)
GLUCOSE SERPL-MCNC: 160 MG/DL — HIGH (ref 70–99)
HCT VFR BLD CALC: 37.4 % — LOW (ref 39–50)
HGB BLD-MCNC: 12.5 G/DL — LOW (ref 13–17)
MAGNESIUM SERPL-MCNC: 1.9 MG/DL — SIGNIFICANT CHANGE UP (ref 1.6–2.6)
MCHC RBC-ENTMCNC: 30.9 PG — SIGNIFICANT CHANGE UP (ref 27–34)
MCHC RBC-ENTMCNC: 33.4 GM/DL — SIGNIFICANT CHANGE UP (ref 32–36)
MCV RBC AUTO: 92.6 FL — SIGNIFICANT CHANGE UP (ref 80–100)
NRBC # BLD: 0 /100 WBCS — SIGNIFICANT CHANGE UP (ref 0–0)
PLATELET # BLD AUTO: 283 K/UL — SIGNIFICANT CHANGE UP (ref 150–400)
POTASSIUM SERPL-MCNC: 4.2 MMOL/L — SIGNIFICANT CHANGE UP (ref 3.5–5.3)
POTASSIUM SERPL-SCNC: 4.2 MMOL/L — SIGNIFICANT CHANGE UP (ref 3.5–5.3)
PROT SERPL-MCNC: 7 G/DL — SIGNIFICANT CHANGE UP (ref 6–8.3)
RBC # BLD: 4.04 M/UL — LOW (ref 4.2–5.8)
RBC # FLD: 12.2 % — SIGNIFICANT CHANGE UP (ref 10.3–14.5)
SODIUM SERPL-SCNC: 137 MMOL/L — SIGNIFICANT CHANGE UP (ref 135–145)
WBC # BLD: 6.5 K/UL — SIGNIFICANT CHANGE UP (ref 3.8–10.5)
WBC # FLD AUTO: 6.5 K/UL — SIGNIFICANT CHANGE UP (ref 3.8–10.5)

## 2022-10-11 PROCEDURE — 93010 ELECTROCARDIOGRAM REPORT: CPT

## 2022-10-11 PROCEDURE — 71046 X-RAY EXAM CHEST 2 VIEWS: CPT | Mod: 26

## 2022-10-11 PROCEDURE — 99233 SBSQ HOSP IP/OBS HIGH 50: CPT

## 2022-10-11 RX ORDER — METOPROLOL TARTRATE 50 MG
25 TABLET ORAL
Refills: 0 | Status: DISCONTINUED | OUTPATIENT
Start: 2022-10-11 | End: 2022-10-12

## 2022-10-11 RX ADMIN — Medication 2: at 07:08

## 2022-10-11 RX ADMIN — TAMSULOSIN HYDROCHLORIDE 0.4 MILLIGRAM(S): 0.4 CAPSULE ORAL at 22:25

## 2022-10-11 RX ADMIN — OXYCODONE AND ACETAMINOPHEN 1 TABLET(S): 5; 325 TABLET ORAL at 12:12

## 2022-10-11 RX ADMIN — Medication 300 MILLIGRAM(S): at 22:25

## 2022-10-11 RX ADMIN — Medication 4: at 17:02

## 2022-10-11 RX ADMIN — LORATADINE 10 MILLIGRAM(S): 10 TABLET ORAL at 22:25

## 2022-10-11 RX ADMIN — Medication 2: at 22:24

## 2022-10-11 RX ADMIN — Medication 25 MILLIGRAM(S): at 17:29

## 2022-10-11 RX ADMIN — OXYCODONE AND ACETAMINOPHEN 1 TABLET(S): 5; 325 TABLET ORAL at 05:56

## 2022-10-11 RX ADMIN — LOSARTAN POTASSIUM 50 MILLIGRAM(S): 100 TABLET, FILM COATED ORAL at 05:56

## 2022-10-11 RX ADMIN — SIMVASTATIN 40 MILLIGRAM(S): 20 TABLET, FILM COATED ORAL at 22:25

## 2022-10-11 RX ADMIN — OXYCODONE AND ACETAMINOPHEN 1 TABLET(S): 5; 325 TABLET ORAL at 06:56

## 2022-10-11 RX ADMIN — OXYCODONE AND ACETAMINOPHEN 1 TABLET(S): 5; 325 TABLET ORAL at 19:35

## 2022-10-11 RX ADMIN — Medication 4: at 12:12

## 2022-10-11 RX ADMIN — OXYCODONE AND ACETAMINOPHEN 1 TABLET(S): 5; 325 TABLET ORAL at 13:12

## 2022-10-11 NOTE — PROGRESS NOTE ADULT - SUBJECTIVE AND OBJECTIVE BOX
Cardiology PA Adult Progress Note    SUBJECTIVE ASSESSMENT:  	  MEDICATIONS:  losartan 50 milliGRAM(s) Oral daily  tamsulosin 0.4 milliGRAM(s) Oral at bedtime  clindamycin IVPB 900 milliGRAM(s) IV Intermittent once  loratadine 10 milliGRAM(s) Oral at bedtime  oxycodone    5 mG/acetaminophen 325 mG 1 Tablet(s) Oral every 6 hours PRN  allopurinol 300 milliGRAM(s) Oral at bedtime  glucagon  Injectable 1 milliGRAM(s) IntraMuscular once  insulin lispro (ADMELOG) corrective regimen sliding scale   SubCutaneous Before meals and at bedtime  simvastatin 40 milliGRAM(s) Oral at bedtime  influenza   Vaccine 0.5 milliLiter(s) IntraMuscular once  	  VITAL SIGNS:  T(C): 37.6 (10-11-22 @ 09:14), Max: 37.9 (10-11-22 @ 06:24)  HR: 87 (10-11-22 @ 05:20) (87 - 98)  BP: 153/75 (10-11-22 @ 05:20) (121/60 - 168/74)  RR: 18 (10-11-22 @ 05:20) (18 - 18)  SpO2: 96% (10-11-22 @ 05:20) (96% - 98%)    Height (cm): 198.1 (10-10 @ 14:15)  Weight (kg): 128.1 (10-10 @ 14:15)  BMI (kg/m2): 32.6 (10-10 @ 14:15)  BSA (m2): 2.61 (10-10 @ 14:15)    I&O's Summary  10 Oct 2022 07:01  -  11 Oct 2022 07:00  --------------------------------------------------------  IN: 0 mL / OUT: 1375 mL / NET: -1375 mL                                     PHYSICAL EXAM:  Appearance: Normal	  HEENT: Normal oral mucosa, PERRL, EOMI	  Neck: Supple, + JVD/ - JVD; ___ Carotid Bruit   Cardiovascular: Normal S1 S2, No murmurs  Respiratory: Lungs clear to auscultation/Decreased Breath Sounds/No Rales, Rhonchi, Wheezing	  Gastrointestinal:  Soft, Non-tender, + BS	  Skin: No rashes, No ecchymoses, No cyanosis  Extremities: Normal range of motion, No clubbing, cyanosis or edema  Vascular: Peripheral pulses palpable 2+ bilaterally  Neurologic: Non-focal  Psychiatry: A & O x 3, Mood & affect appropriate    LABS:	 	                      12.5   6.50  )-----------( 283      ( 11 Oct 2022 05:30 )             37.4     10-11    137  |  100  |  13  ----------------------------<  160<H>  4.2   |  24  |  0.93    Ca    9.0      11 Oct 2022 05:30  Mg     1.9     10-11    TPro  7.0  /  Alb  3.7  /  TBili  1.5<H>  /  DBili  x   /  AST  28  /  ALT  20  /  AlkPhos  67  10-11    proBNP:   Lipid Profile:   HgA1c:   TSH:    Cardiology PA Adult Progress Note    SUBJECTIVE ASSESSMENT: Pt seen and examined at bedside this AM sitting up comfortably in bed w/o any complaints or events overnight. He reports PPM site tenderness as well as facial pain 2/2 fracture. t   	  MEDICATIONS:  losartan 50 milliGRAM(s) Oral daily  tamsulosin 0.4 milliGRAM(s) Oral at bedtime  clindamycin IVPB 900 milliGRAM(s) IV Intermittent once  loratadine 10 milliGRAM(s) Oral at bedtime  oxycodone    5 mG/acetaminophen 325 mG 1 Tablet(s) Oral every 6 hours PRN  allopurinol 300 milliGRAM(s) Oral at bedtime  glucagon  Injectable 1 milliGRAM(s) IntraMuscular once  insulin lispro (ADMELOG) corrective regimen sliding scale   SubCutaneous Before meals and at bedtime  simvastatin 40 milliGRAM(s) Oral at bedtime  influenza   Vaccine 0.5 milliLiter(s) IntraMuscular once  	  VITAL SIGNS:  T(C): 37.6 (10-11-22 @ 09:14), Max: 37.9 (10-11-22 @ 06:24)  HR: 87 (10-11-22 @ 05:20) (87 - 98)  BP: 153/75 (10-11-22 @ 05:20) (121/60 - 168/74)  RR: 18 (10-11-22 @ 05:20) (18 - 18)  SpO2: 96% (10-11-22 @ 05:20) (96% - 98%)    Height (cm): 198.1 (10-10 @ 14:15)  Weight (kg): 128.1 (10-10 @ 14:15)  BMI (kg/m2): 32.6 (10-10 @ 14:15)  BSA (m2): 2.61 (10-10 @ 14:15)    I&O's Summary  10 Oct 2022 07:01  -  11 Oct 2022 07:00  --------------------------------------------------------  IN: 0 mL / OUT: 1375 mL / NET: -1375 mL                                     PHYSICAL EXAM:  Appearance: Normal	  HEENT: Normal oral mucosa, PERRL, EOMI	  Neck: Supple, + JVD/ - JVD; ___ Carotid Bruit   Cardiovascular: Normal S1 S2, No murmurs  Respiratory: Lungs clear to auscultation/Decreased Breath Sounds/No Rales, Rhonchi, Wheezing	  Gastrointestinal:  Soft, Non-tender, + BS	  Skin: No rashes, No ecchymoses, No cyanosis  Extremities: Normal range of motion, No clubbing, cyanosis or edema  Vascular: Peripheral pulses palpable 2+ bilaterally  Neurologic: Non-focal  Psychiatry: A & O x 3, Mood & affect appropriate    LABS:	 	                      12.5   6.50  )-----------( 283      ( 11 Oct 2022 05:30 )             37.4     10-11    137  |  100  |  13  ----------------------------<  160<H>  4.2   |  24  |  0.93    Ca    9.0      11 Oct 2022 05:30  Mg     1.9     10-11    TPro  7.0  /  Alb  3.7  /  TBili  1.5<H>  /  DBili  x   /  AST  28  /  ALT  20  /  AlkPhos  67  10-11    proBNP:   Lipid Profile:   HgA1c:   TSH:    Cardiology PA Adult Progress Note    SUBJECTIVE ASSESSMENT: Pt seen and examined at bedside this AM sitting up comfortably in bed w/o any complaints or events overnight. He reports PPM site tenderness as well as facial pain 2/2 fracture. Pt also reports lightheadedness when ambulating to the bathroom. He denies any palpitations, dizziness, CP, SOB, orthopnea, PND, N/V, HA, visual changes, or any other symptoms.   	  MEDICATIONS:  losartan 50 milliGRAM(s) Oral daily  tamsulosin 0.4 milliGRAM(s) Oral at bedtime  clindamycin IVPB 900 milliGRAM(s) IV Intermittent once  loratadine 10 milliGRAM(s) Oral at bedtime  oxycodone    5 mG/acetaminophen 325 mG 1 Tablet(s) Oral every 6 hours PRN  allopurinol 300 milliGRAM(s) Oral at bedtime  glucagon  Injectable 1 milliGRAM(s) IntraMuscular once  insulin lispro (ADMELOG) corrective regimen sliding scale   SubCutaneous Before meals and at bedtime  simvastatin 40 milliGRAM(s) Oral at bedtime  influenza   Vaccine 0.5 milliLiter(s) IntraMuscular once  	  VITAL SIGNS:  T(C): 37.6 (10-11-22 @ 09:14), Max: 37.9 (10-11-22 @ 06:24)  HR: 87 (10-11-22 @ 05:20) (87 - 98)  BP: 153/75 (10-11-22 @ 05:20) (121/60 - 168/74)  RR: 18 (10-11-22 @ 05:20) (18 - 18)  SpO2: 96% (10-11-22 @ 05:20) (96% - 98%)    Height (cm): 198.1 (10-10 @ 14:15)  Weight (kg): 128.1 (10-10 @ 14:15)  BMI (kg/m2): 32.6 (10-10 @ 14:15)  BSA (m2): 2.61 (10-10 @ 14:15)    I&O's Summary  10 Oct 2022 07:01  -  11 Oct 2022 07:00  --------------------------------------------------------  IN: 0 mL / OUT: 1375 mL / NET: -1375 mL                                     PHYSICAL EXAM:  Appearance: Normal	  HEENT: Normal oral mucosa, PERRL, EOMI	  Neck: Supple, - JVD; no Carotid Bruit   Cardiovascular: Normal S1 S2, No murmurs  Respiratory: Lungs clear to auscultation, No Rales, Rhonchi, Wheezing	  Gastrointestinal:  Soft, Non-tender, + BS	  Skin: No rashes, No ecchymoses, No cyanosis  Extremities: Normal range of motion, No clubbing, cyanosis or edema  Vascular: Peripheral pulses palpable 2+ bilaterally  Neurologic: Non-focal  Psychiatry: A & O x 3, Mood & affect appropriate    LABS:	 	                      12.5   6.50  )-----------( 283      ( 11 Oct 2022 05:30 )             37.4     10-11    137  |  100  |  13  ----------------------------<  160<H>  4.2   |  24  |  0.93    Ca    9.0      11 Oct 2022 05:30  Mg     1.9     10-11    TPro  7.0  /  Alb  3.7  /  TBili  1.5<H>  /  DBili  x   /  AST  28  /  ALT  20  /  AlkPhos  67  10-11

## 2022-10-11 NOTE — PROGRESS NOTE ADULT - PROBLEM SELECTOR PLAN 9
-Continue Tamsulosin 0.4mg QD    F: None  E: Replete if K<4 or Mag<2  N: DASH Diet  VTEppx: none   Dispo: cardiac tele -Continue Tamsulosin 0.4mg QD    F: None  E: Replete if K<4 or Mag<2  N: DASH Diet  VTEppx: none   Dispo: cardiac tele  PT: no needs

## 2022-10-11 NOTE — PROGRESS NOTE ADULT - PROBLEM SELECTOR PLAN 5
Lipids WNL  -Continue Simvastatin 40mg QHS

## 2022-10-11 NOTE — PROGRESS NOTE ADULT - SUBJECTIVE AND OBJECTIVE BOX
EPS Device interrogation    Indication: Post implant check    Device model: Biotronik Edora 8 SUZETTE 					    Implanting Physician: Dr. Ogden    Functioning Mode: DDI 50			    Underlying Rhythm: SR    Pacemaker dependency: No    Battery status: 100%     Lead parameters:   				  RA lead:    Sense:  3.3  mV.              Threshold:   0.4  V at 0.5 ms.          Impedance:   526ohms  RV lead:    Sense:  22.8 mV.              Threshold:   0.5  V at 0.5 ms.          Impedance:   721 ohms    Events/Alert: None     - CXR shows good lead placement and no pneumothorax. Incision..    - Plug remote monitor at bedside.   - Please follow-up with Dr. Nunes on November 15 at 10:20am.     EPS Device interrogation    Indication: Post implant check    Device model: Biotronik Edora 8 SUZETTE 					    Implanting Physician: Dr. Ogden    Functioning Mode: DDI 50			    Underlying Rhythm: SR    Pacemaker dependency: No    Battery status: 100%     Lead parameters:   				  RA lead:    Sense:  3.3  mV.              Threshold:   0.4  V at 0.5 ms.          Impedance:   526ohms  RV lead:    Sense:  22.8 mV.              Threshold:   0.5  V at 0.5 ms.          Impedance:   721 ohms    Events/Alert: None     - CXR shows good lead placement and no pneumothorax. Incision is healing well with no bleeding or hematoma.   - Discussed home care instructions with the patient including avoiding pushing, pulling or lifting anything heavier than 5lbs with the left arm for one month. The incision will heal over the next 2-3 weeks. During this time, you may shower but avoid tub baths and swimming. The incision is covered with a waterproof glue - do not peel it off, it will flake off over time.     - Restart Xarelto 10/13.   - Plug remote monitor at bedside.   - Please follow-up with Dr. Nunes on November 15 at 10:20am.

## 2022-10-11 NOTE — PROGRESS NOTE ADULT - ASSESSMENT
62M w/ FHx CAD and PMHx HTN, HLD, h/o pre syncope s/p ILR 2/2018 revealing AFib and SVT w/ aberrancy (on Xarelto, s/p ILR removal 7/2022), DM-II, Gout and BPH, presented to [facility] ED 10/8 after a syncopal episode w/o prodrome, suffering nose laceration s/p repair @ [facility] (pt signed out AMA and requesting care @ [facility]), pt now admitted to cardiac telemetry for further syncope w/u. EP consulted and pt now s/p PPM placement 10/10/22. 62M w/ FHx CAD and PMHx HTN, HLD, h/o pre syncope s/p ILR 2/2018 revealing AFib and SVT w/ aberrancy (on Xarelto, s/p ILR removal 7/2022), DM-II, Gout and BPH, presented to [facility] ED 10/8 after a syncopal episode w/o prodrome, suffering nose laceration/fracture s/p sutures @ [facility] 10/7 (pt signed out AMA and requesting care @ [facility]), pt now admitted to cardiac telemetry for further syncope w/u. EP consulted and pt now s/p PPM placement 10/10/22. 62M w/ FHx CAD and PMHx HTN, HLD, h/o pre syncope s/p ILR 2/2018 revealing AFib and SVT w/ aberrancy (on Xarelto, s/p ILR removal 7/2022), DM-II, Gout and BPH, initially presented to [facility] 10/7 after a syncopal episode w/o prodrome, suffering nose laceration/fracture s/p suture repair and pt signed AMA and requested care at [facility] instead, who is now admitted to cardiac telemetry for further syncope w/u. EP consulted and pt now s/p PPM placement 10/10/22. 62M w/ FHx CAD and PMHx HTN, HLD, h/o pre syncope s/p ILR 2/2018 revealing AFib and SVT w/ aberrancy (on Xarelto, s/p ILR removal 7/2022), DM-II, Gout and BPH, initially presented to [facility] 10/7 after a syncopal episode w/o prodrome, suffering nose laceration/fracture, s/p suture repair, signed out AMA and requested care at [facility] instead, pt is now admitted to cardiac telemetry for further syncope w/u. EP consulted and pt now s/p PPM placement 10/10/22.

## 2022-10-11 NOTE — PROGRESS NOTE ADULT - PROBLEM SELECTOR PLAN 1
presents w/ sudden LOC w/o prodrome while standing at work, pt endorses intermittent lightheadedness prior in the day; pt currently asymptomatic and HD stable  -orthostatics negative, VSS, no events on telemetry, and EKG non ischemic  -TTE 7/30/22: normal LVEF, mild LA enlargement, no valvular disease.  -EP consulted and s/p PPM placement 10/10  -outpt f/u w. Dr. Nunes on 11/15 presents w/ sudden LOC w/o prodrome while standing at work, pt endorses intermittent lightheadedness prior in the day; pt currently asymptomatic and HD stable  -orthostatics negative, VSS, no events on telemetry and EKG non ischemic  -TTE 7/30/22: normal LVEF, mild LA enlargement, no valvular disease.  -EP consulted and s/p PPM placement 10/10  -outpt f/u w. Dr. Nunes on 11/15

## 2022-10-11 NOTE — PROGRESS NOTE ADULT - PROBLEM SELECTOR PLAN 7
IMPROVED  -Cr 1.35 on admit, now wnl

## 2022-10-11 NOTE — PROGRESS NOTE ADULT - PROBLEM SELECTOR PLAN 2
pt suffered laceration and fracture of nose after LOC, s/p nasal sutures placed at [facility] on 10/7  -continue Oxycodone 5/325mg Q6 PRN  -suture removal in 1 week  -ENT consulted for outpt f/u pt suffered laceration and fracture of nasal bone after LOC, s/p nasal sutures placed at [facility] on 10/7  -continue Oxycodone 5/325mg Q6 PRN  -OMFS consulted, outpt f/u within 1 week of d/c and removal sutures on 10/12 prior to d/c pt suffered laceration and fracture of nasal bone after LOC, s/p sutures placed at [facility] on 10/7  -continue Oxycodone 5/325mg Q6 PRN  -OMFS consulted, outpt f/u within 1 week and removal of sutures on 10/12 prior to d/c pt suffered laceration and fracture of nasal bone after LOC and s/p sutures placed at [facility] on 10/7  -TIARA sent to [place] for collaterals, pending fax  -OMFS curbsided, outpt f/u w/ Dr. Umanzor on 10/18  -sutures to be removed on 10/12 prior to d/c  -continue Oxycodone 5/325mg Q6 PRN

## 2022-10-11 NOTE — PROGRESS NOTE ADULT - PROBLEM SELECTOR PLAN 6
A1c 6.8%  -Continue mISS and FS QID  -Holding Metformin 1000mg and Trulicity

## 2022-10-12 ENCOUNTER — TRANSCRIPTION ENCOUNTER (OUTPATIENT)
Age: 62
End: 2022-10-12

## 2022-10-12 VITALS
DIASTOLIC BLOOD PRESSURE: 67 MMHG | OXYGEN SATURATION: 97 % | RESPIRATION RATE: 16 BRPM | HEART RATE: 84 BPM | SYSTOLIC BLOOD PRESSURE: 127 MMHG

## 2022-10-12 LAB
ANION GAP SERPL CALC-SCNC: 12 MMOL/L — SIGNIFICANT CHANGE UP (ref 5–17)
BUN SERPL-MCNC: 14 MG/DL — SIGNIFICANT CHANGE UP (ref 7–23)
CALCIUM SERPL-MCNC: 9 MG/DL — SIGNIFICANT CHANGE UP (ref 8.4–10.5)
CHLORIDE SERPL-SCNC: 100 MMOL/L — SIGNIFICANT CHANGE UP (ref 96–108)
CO2 SERPL-SCNC: 25 MMOL/L — SIGNIFICANT CHANGE UP (ref 22–31)
CREAT SERPL-MCNC: 0.9 MG/DL — SIGNIFICANT CHANGE UP (ref 0.5–1.3)
EGFR: 96 ML/MIN/1.73M2 — SIGNIFICANT CHANGE UP
GLUCOSE BLDC GLUCOMTR-MCNC: 169 MG/DL — HIGH (ref 70–99)
GLUCOSE BLDC GLUCOMTR-MCNC: 190 MG/DL — HIGH (ref 70–99)
GLUCOSE SERPL-MCNC: 159 MG/DL — HIGH (ref 70–99)
HCT VFR BLD CALC: 39 % — SIGNIFICANT CHANGE UP (ref 39–50)
HGB BLD-MCNC: 12.9 G/DL — LOW (ref 13–17)
MAGNESIUM SERPL-MCNC: 1.9 MG/DL — SIGNIFICANT CHANGE UP (ref 1.6–2.6)
MCHC RBC-ENTMCNC: 30.9 PG — SIGNIFICANT CHANGE UP (ref 27–34)
MCHC RBC-ENTMCNC: 33.1 GM/DL — SIGNIFICANT CHANGE UP (ref 32–36)
MCV RBC AUTO: 93.5 FL — SIGNIFICANT CHANGE UP (ref 80–100)
NRBC # BLD: 0 /100 WBCS — SIGNIFICANT CHANGE UP (ref 0–0)
PLATELET # BLD AUTO: 282 K/UL — SIGNIFICANT CHANGE UP (ref 150–400)
POTASSIUM SERPL-MCNC: 4.6 MMOL/L — SIGNIFICANT CHANGE UP (ref 3.5–5.3)
POTASSIUM SERPL-SCNC: 4.6 MMOL/L — SIGNIFICANT CHANGE UP (ref 3.5–5.3)
RBC # BLD: 4.17 M/UL — LOW (ref 4.2–5.8)
RBC # FLD: 12.1 % — SIGNIFICANT CHANGE UP (ref 10.3–14.5)
SODIUM SERPL-SCNC: 137 MMOL/L — SIGNIFICANT CHANGE UP (ref 135–145)
WBC # BLD: 6.17 K/UL — SIGNIFICANT CHANGE UP (ref 3.8–10.5)
WBC # FLD AUTO: 6.17 K/UL — SIGNIFICANT CHANGE UP (ref 3.8–10.5)

## 2022-10-12 PROCEDURE — 85610 PROTHROMBIN TIME: CPT

## 2022-10-12 PROCEDURE — 84484 ASSAY OF TROPONIN QUANT: CPT

## 2022-10-12 PROCEDURE — 36415 COLL VENOUS BLD VENIPUNCTURE: CPT

## 2022-10-12 PROCEDURE — 85027 COMPLETE CBC AUTOMATED: CPT

## 2022-10-12 PROCEDURE — 80053 COMPREHEN METABOLIC PANEL: CPT

## 2022-10-12 PROCEDURE — C1898: CPT

## 2022-10-12 PROCEDURE — 85025 COMPLETE CBC W/AUTO DIFF WBC: CPT

## 2022-10-12 PROCEDURE — 82962 GLUCOSE BLOOD TEST: CPT

## 2022-10-12 PROCEDURE — 99285 EMERGENCY DEPT VISIT HI MDM: CPT | Mod: 25

## 2022-10-12 PROCEDURE — 84443 ASSAY THYROID STIM HORMONE: CPT

## 2022-10-12 PROCEDURE — 80061 LIPID PANEL: CPT

## 2022-10-12 PROCEDURE — 85730 THROMBOPLASTIN TIME PARTIAL: CPT

## 2022-10-12 PROCEDURE — 73030 X-RAY EXAM OF SHOULDER: CPT

## 2022-10-12 PROCEDURE — 83036 HEMOGLOBIN GLYCOSYLATED A1C: CPT

## 2022-10-12 PROCEDURE — 83880 ASSAY OF NATRIURETIC PEPTIDE: CPT

## 2022-10-12 PROCEDURE — 81001 URINALYSIS AUTO W/SCOPE: CPT

## 2022-10-12 PROCEDURE — 84439 ASSAY OF FREE THYROXINE: CPT

## 2022-10-12 PROCEDURE — 87635 SARS-COV-2 COVID-19 AMP PRB: CPT

## 2022-10-12 PROCEDURE — 80048 BASIC METABOLIC PNL TOTAL CA: CPT

## 2022-10-12 PROCEDURE — 93005 ELECTROCARDIOGRAM TRACING: CPT

## 2022-10-12 PROCEDURE — C1785: CPT

## 2022-10-12 PROCEDURE — 86803 HEPATITIS C AB TEST: CPT

## 2022-10-12 PROCEDURE — 71046 X-RAY EXAM CHEST 2 VIEWS: CPT

## 2022-10-12 PROCEDURE — 71045 X-RAY EXAM CHEST 1 VIEW: CPT

## 2022-10-12 PROCEDURE — 96374 THER/PROPH/DIAG INJ IV PUSH: CPT

## 2022-10-12 PROCEDURE — C1889: CPT

## 2022-10-12 PROCEDURE — 99239 HOSP IP/OBS DSCHRG MGMT >30: CPT

## 2022-10-12 PROCEDURE — 83735 ASSAY OF MAGNESIUM: CPT

## 2022-10-12 PROCEDURE — C1892: CPT

## 2022-10-12 RX ORDER — RIVAROXABAN 15 MG-20MG
1 KIT ORAL
Qty: 0 | Refills: 0 | DISCHARGE

## 2022-10-12 RX ORDER — TAMSULOSIN HYDROCHLORIDE 0.4 MG/1
1 CAPSULE ORAL
Qty: 0 | Refills: 0 | DISCHARGE

## 2022-10-12 RX ORDER — MAGNESIUM OXIDE 400 MG ORAL TABLET 241.3 MG
400 TABLET ORAL ONCE
Refills: 0 | Status: COMPLETED | OUTPATIENT
Start: 2022-10-12 | End: 2022-10-12

## 2022-10-12 RX ORDER — TAMSULOSIN HYDROCHLORIDE 0.4 MG/1
1 CAPSULE ORAL
Qty: 0 | Refills: 0 | DISCHARGE
Start: 2022-10-12

## 2022-10-12 RX ADMIN — OXYCODONE AND ACETAMINOPHEN 1 TABLET(S): 5; 325 TABLET ORAL at 05:57

## 2022-10-12 RX ADMIN — OXYCODONE AND ACETAMINOPHEN 1 TABLET(S): 5; 325 TABLET ORAL at 06:57

## 2022-10-12 RX ADMIN — LOSARTAN POTASSIUM 50 MILLIGRAM(S): 100 TABLET, FILM COATED ORAL at 05:44

## 2022-10-12 RX ADMIN — MAGNESIUM OXIDE 400 MG ORAL TABLET 400 MILLIGRAM(S): 241.3 TABLET ORAL at 12:32

## 2022-10-12 RX ADMIN — OXYCODONE AND ACETAMINOPHEN 1 TABLET(S): 5; 325 TABLET ORAL at 13:23

## 2022-10-12 RX ADMIN — OXYCODONE AND ACETAMINOPHEN 1 TABLET(S): 5; 325 TABLET ORAL at 12:32

## 2022-10-12 RX ADMIN — Medication 2: at 12:31

## 2022-10-12 RX ADMIN — Medication 25 MILLIGRAM(S): at 05:44

## 2022-10-12 NOTE — DISCHARGE NOTE NURSING/CASE MANAGEMENT/SOCIAL WORK - PATIENT PORTAL LINK FT
You can access the FollowMyHealth Patient Portal offered by [facility] by registering at the following website: [email or web address]. By joining [facility]’s FollowMyHealth portal, you will also be able to view your health information using other applications (apps) compatible with our system.

## 2022-10-12 NOTE — DISCHARGE NOTE NURSING/CASE MANAGEMENT/SOCIAL WORK - NSDCPEFALRISK_GEN_ALL_CORE
For information on Fall & Injury Prevention, visit: [email or web address] OR  [email or web address] OR  https://www.cdc.gov/steadi/patient.html

## 2022-10-19 DIAGNOSIS — Z79.85 LONG-TERM (CURRENT) USE OF INJECTABLE NON-INSULIN ANTIDIABETIC DRUGS: ICD-10-CM

## 2022-10-19 DIAGNOSIS — I49.5 SICK SINUS SYNDROME: ICD-10-CM

## 2022-10-19 DIAGNOSIS — I36.1 NONRHEUMATIC TRICUSPID (VALVE) INSUFFICIENCY: ICD-10-CM

## 2022-10-19 DIAGNOSIS — E78.5 HYPERLIPIDEMIA, UNSPECIFIED: ICD-10-CM

## 2022-10-19 DIAGNOSIS — I10 ESSENTIAL (PRIMARY) HYPERTENSION: ICD-10-CM

## 2022-10-19 DIAGNOSIS — Z98.1 ARTHRODESIS STATUS: ICD-10-CM

## 2022-10-19 DIAGNOSIS — Z83.3 FAMILY HISTORY OF DIABETES MELLITUS: ICD-10-CM

## 2022-10-19 DIAGNOSIS — Z79.01 LONG TERM (CURRENT) USE OF ANTICOAGULANTS: ICD-10-CM

## 2022-10-19 DIAGNOSIS — M10.9 GOUT, UNSPECIFIED: ICD-10-CM

## 2022-10-19 DIAGNOSIS — Z88.0 ALLERGY STATUS TO PENICILLIN: ICD-10-CM

## 2022-10-19 DIAGNOSIS — I47.1 SUPRAVENTRICULAR TACHYCARDIA: ICD-10-CM

## 2022-10-19 DIAGNOSIS — Z96.652 PRESENCE OF LEFT ARTIFICIAL KNEE JOINT: ICD-10-CM

## 2022-10-19 DIAGNOSIS — I49.1 ATRIAL PREMATURE DEPOLARIZATION: ICD-10-CM

## 2022-10-19 DIAGNOSIS — Z79.84 LONG TERM (CURRENT) USE OF ORAL HYPOGLYCEMIC DRUGS: ICD-10-CM

## 2022-10-19 DIAGNOSIS — I45.5 OTHER SPECIFIED HEART BLOCK: ICD-10-CM

## 2022-10-19 DIAGNOSIS — Z79.82 LONG TERM (CURRENT) USE OF ASPIRIN: ICD-10-CM

## 2022-10-19 DIAGNOSIS — Z91.018 ALLERGY TO OTHER FOODS: ICD-10-CM

## 2022-10-19 DIAGNOSIS — N40.0 BENIGN PROSTATIC HYPERPLASIA WITHOUT LOWER URINARY TRACT SYMPTOMS: ICD-10-CM

## 2022-10-19 DIAGNOSIS — I48.0 PAROXYSMAL ATRIAL FIBRILLATION: ICD-10-CM

## 2022-10-19 DIAGNOSIS — E66.9 OBESITY, UNSPECIFIED: ICD-10-CM

## 2022-10-19 DIAGNOSIS — E11.9 TYPE 2 DIABETES MELLITUS WITHOUT COMPLICATIONS: ICD-10-CM

## 2022-10-19 DIAGNOSIS — M25.512 PAIN IN LEFT SHOULDER: ICD-10-CM

## 2022-10-19 DIAGNOSIS — M19.90 UNSPECIFIED OSTEOARTHRITIS, UNSPECIFIED SITE: ICD-10-CM

## 2022-10-24 ENCOUNTER — NON-APPOINTMENT (OUTPATIENT)
Age: 62
End: 2022-10-24

## 2022-11-08 ENCOUNTER — APPOINTMENT (OUTPATIENT)
Dept: HEART AND VASCULAR | Facility: CLINIC | Age: 62
End: 2022-11-08

## 2022-11-08 VITALS
WEIGHT: 270 LBS | TEMPERATURE: 97.3 F | HEART RATE: 82 BPM | HEIGHT: 78 IN | SYSTOLIC BLOOD PRESSURE: 152 MMHG | BODY MASS INDEX: 31.24 KG/M2 | DIASTOLIC BLOOD PRESSURE: 69 MMHG

## 2022-11-08 DIAGNOSIS — G90.01 CAROTID SINUS SYNCOPE: ICD-10-CM

## 2022-11-08 PROCEDURE — 99024 POSTOP FOLLOW-UP VISIT: CPT

## 2022-11-08 PROCEDURE — 93280 PM DEVICE PROGR EVAL DUAL: CPT

## 2022-11-08 NOTE — PROCEDURE
[No] : not [NSR] : normal sinus rhythm [Pacemaker] : pacemaker [Normal] : The battery status is normal. [DDI] : DDI [Lead Imp:  ___ohms] : lead impedance was [unfilled] ohms [Sensing Amplitude ___mv] : sensing amplitude was [unfilled] mv [___V @] : [unfilled] V [___ ms] : [unfilled] ms [de-identified] : Plasmonixk  [de-identified] : 10/10/22 [de-identified] : 50 [de-identified] : No events\par AP 0\par  0

## 2022-11-08 NOTE — ADDENDUM
[FreeTextEntry1] : I, Ashlee Cedeno, am scribing for and the presence of Dr. Nunes the following sections: HPI, PMH,Family/social history, ROS, Physical Exam, Assessment / Plan.\par \par I, Raf Nunes, personally performed the services described in the documentation, reviewed the documentation recorded by the scribe in my presence and it accurately and completely records my words and actions.\par

## 2022-11-08 NOTE — REASON FOR VISIT
[Follow-up Device Check] : is here today for a follow-up device check visit for [Arrhythmia/ECG Abnorrmalities] : arrhythmia/ECG abnormalities [Follow-Up - Clinic] : a clinic follow-up of [Atrial Fibrillation] : atrial fibrillation [Palpitations] : palpitations

## 2022-11-08 NOTE — HISTORY OF PRESENT ILLNESS
[None] : The patient complains of no symptoms [FreeTextEntry1] : 63 y/o M h/o HTN, HLD, DM II, palpitations and presyncope now s/p Medtronic ILR implant with paroxysmal atrial fibrillation and SVT with aberrancy.  ILR reached VIJAY and was explanted 7/2022.  Interval history significant for syncope without prodrome with facial trauma (nose fracture).  Exam at [facility] significant for carotid hypersensitivity.  S/P dual chamber PPM placement 10/10/22.  He presents for post procedure follow-up.  Denies recurrent presyncope/syncope.    \par \par TTE 7/30/22 revealed normal LVEF w/o valvular disease and OMFS consulted for fur\par \par \par cMRI 12/2017 LVEF 49%, mild global HK, RV normal size/function, hyperenhancement of superior and inferior right ventricular insertion points (nonspecific), \par Event monitor 8/24 - 9/4/16: SR/ST, rare PACs, slow WCT possibly NSVT (correlates with symptom)\par CTA 6/2016 with nonobstructive CAD

## 2023-01-11 ENCOUNTER — NON-APPOINTMENT (OUTPATIENT)
Age: 63
End: 2023-01-11

## 2023-05-09 ENCOUNTER — APPOINTMENT (OUTPATIENT)
Dept: HEART AND VASCULAR | Facility: CLINIC | Age: 63
End: 2023-05-09
Payer: COMMERCIAL

## 2023-05-09 VITALS
HEART RATE: 86 BPM | DIASTOLIC BLOOD PRESSURE: 69 MMHG | WEIGHT: 275 LBS | SYSTOLIC BLOOD PRESSURE: 141 MMHG | BODY MASS INDEX: 31.82 KG/M2 | HEIGHT: 78 IN

## 2023-05-09 PROCEDURE — 99214 OFFICE O/P EST MOD 30 MIN: CPT | Mod: 25

## 2023-05-09 PROCEDURE — 93280 PM DEVICE PROGR EVAL DUAL: CPT

## 2023-05-09 NOTE — END OF VISIT
[] : Nurse Practitioner [>50% of Time Spent on Counseling and Coordination of Care for  ___] : Greater than 50% of the encounter time was spent on counseling and coordination of care for [unfilled] [Time Spent: ___ minutes] : I have spent [unfilled] minutes of time on the encounter.

## 2023-05-09 NOTE — HISTORY OF PRESENT ILLNESS
[None] : The patient complains of no symptoms [FreeTextEntry1] : 64 y/o M h/o HTN, HLD, DM II, palpitations and presyncope now s/p Medtronic ILR implant with paroxysmal atrial fibrillation and SVT with aberrancy.  ILR reached VIJAY and was explanted 7/2022.  Interval history significant for syncope without prodrome with facial trauma (nose fracture).  Exam at [facility] significant for carotid hypersensitivity.  S/P dual chamber PPM placement 10/10/22.  He notes two episodes of recurrent near syncope within the last several weeks.  He was able to sit down and aborted syncope.  Tolerating Xarelto without bleeding issues. \par \par TTE 7/30/22 revealed normal LVEF w/o valvular disease \par \par SEE PACEART\par \par cMRI 12/2017 LVEF 49%, mild global HK, RV normal size/function, hyperenhancement of superior and inferior right ventricular insertion points (nonspecific), \par Event monitor 8/24 - 9/4/16: SR/ST, rare PACs, slow WCT possibly NSVT (correlates with symptom)\par CTA 6/2016 with nonobstructive CAD

## 2023-05-25 ENCOUNTER — APPOINTMENT (OUTPATIENT)
Dept: CT IMAGING | Facility: HOSPITAL | Age: 63
End: 2023-05-25

## 2023-05-25 ENCOUNTER — INPATIENT (INPATIENT)
Facility: HOSPITAL | Age: 63
LOS: 0 days | Discharge: HOME | DRG: 274 | End: 2023-05-26
Attending: INTERNAL MEDICINE | Admitting: INTERNAL MEDICINE
Payer: COMMERCIAL

## 2023-05-25 VITALS
RESPIRATION RATE: 16 BRPM | DIASTOLIC BLOOD PRESSURE: 56 MMHG | HEART RATE: 74 BPM | OXYGEN SATURATION: 96 % | SYSTOLIC BLOOD PRESSURE: 124 MMHG

## 2023-05-25 DIAGNOSIS — Z98.1 ARTHRODESIS STATUS: Chronic | ICD-10-CM

## 2023-05-25 DIAGNOSIS — Z96.653 PRESENCE OF ARTIFICIAL KNEE JOINT, BILATERAL: Chronic | ICD-10-CM

## 2023-05-25 LAB
ANION GAP SERPL CALC-SCNC: 10 MMOL/L — SIGNIFICANT CHANGE UP (ref 5–17)
APTT BLD: >200 SEC — CRITICAL HIGH (ref 27.5–35.5)
BLD GP AB SCN SERPL QL: NEGATIVE — SIGNIFICANT CHANGE UP
BUN SERPL-MCNC: 7 MG/DL — SIGNIFICANT CHANGE UP (ref 7–23)
CALCIUM SERPL-MCNC: 9.2 MG/DL — SIGNIFICANT CHANGE UP (ref 8.4–10.5)
CHLORIDE SERPL-SCNC: 100 MMOL/L — SIGNIFICANT CHANGE UP (ref 96–108)
CO2 SERPL-SCNC: 26 MMOL/L — SIGNIFICANT CHANGE UP (ref 22–31)
CREAT SERPL-MCNC: 0.98 MG/DL — SIGNIFICANT CHANGE UP (ref 0.5–1.3)
EGFR: 86 ML/MIN/1.73M2 — SIGNIFICANT CHANGE UP
GLUCOSE BLDC GLUCOMTR-MCNC: 229 MG/DL — HIGH (ref 70–99)
GLUCOSE BLDC GLUCOMTR-MCNC: 240 MG/DL — HIGH (ref 70–99)
GLUCOSE BLDC GLUCOMTR-MCNC: 255 MG/DL — HIGH (ref 70–99)
GLUCOSE SERPL-MCNC: 203 MG/DL — HIGH (ref 70–99)
HCT VFR BLD CALC: 36.2 % — LOW (ref 39–50)
HGB BLD-MCNC: 12.1 G/DL — LOW (ref 13–17)
INR BLD: 1.91 — HIGH (ref 0.88–1.16)
MCHC RBC-ENTMCNC: 31 PG — SIGNIFICANT CHANGE UP (ref 27–34)
MCHC RBC-ENTMCNC: 33.4 GM/DL — SIGNIFICANT CHANGE UP (ref 32–36)
MCV RBC AUTO: 92.8 FL — SIGNIFICANT CHANGE UP (ref 80–100)
NRBC # BLD: 0 /100 WBCS — SIGNIFICANT CHANGE UP (ref 0–0)
PLATELET # BLD AUTO: 302 K/UL — SIGNIFICANT CHANGE UP (ref 150–400)
POTASSIUM SERPL-MCNC: 4.2 MMOL/L — SIGNIFICANT CHANGE UP (ref 3.5–5.3)
POTASSIUM SERPL-SCNC: 4.2 MMOL/L — SIGNIFICANT CHANGE UP (ref 3.5–5.3)
PROTHROM AB SERPL-ACNC: 22.9 SEC — HIGH (ref 10.5–13.4)
RBC # BLD: 3.9 M/UL — LOW (ref 4.2–5.8)
RBC # FLD: 13.1 % — SIGNIFICANT CHANGE UP (ref 10.3–14.5)
RH IG SCN BLD-IMP: POSITIVE — SIGNIFICANT CHANGE UP
SODIUM SERPL-SCNC: 136 MMOL/L — SIGNIFICANT CHANGE UP (ref 135–145)
THROMBIN TIME: >300 SEC — HIGH (ref 17.6–24)
WBC # BLD: 7.57 K/UL — SIGNIFICANT CHANGE UP (ref 3.8–10.5)
WBC # FLD AUTO: 7.57 K/UL — SIGNIFICANT CHANGE UP (ref 3.8–10.5)

## 2023-05-25 PROCEDURE — 75572 CT HRT W/3D IMAGE: CPT | Mod: 26

## 2023-05-25 PROCEDURE — 93656 COMPRE EP EVAL ABLTJ ATR FIB: CPT

## 2023-05-25 RX ORDER — METOPROLOL TARTRATE 50 MG
0.5 TABLET ORAL
Qty: 0 | Refills: 0 | DISCHARGE

## 2023-05-25 RX ORDER — METOPROLOL TARTRATE 50 MG
25 TABLET ORAL
Refills: 0 | Status: DISCONTINUED | OUTPATIENT
Start: 2023-05-25 | End: 2023-05-26

## 2023-05-25 RX ORDER — DULAGLUTIDE 4.5 MG/.5ML
1 INJECTION, SOLUTION SUBCUTANEOUS
Qty: 0 | Refills: 0 | DISCHARGE

## 2023-05-25 RX ORDER — SIMVASTATIN 20 MG/1
1 TABLET, FILM COATED ORAL
Qty: 0 | Refills: 0 | DISCHARGE

## 2023-05-25 RX ORDER — INSULIN LISPRO 100/ML
VIAL (ML) SUBCUTANEOUS ONCE
Refills: 0 | Status: COMPLETED | OUTPATIENT
Start: 2023-05-25 | End: 2023-05-25

## 2023-05-25 RX ORDER — GLUCAGON INJECTION, SOLUTION 0.5 MG/.1ML
1 INJECTION, SOLUTION SUBCUTANEOUS ONCE
Refills: 0 | Status: DISCONTINUED | OUTPATIENT
Start: 2023-05-25 | End: 2023-05-26

## 2023-05-25 RX ORDER — DEXTROSE 50 % IN WATER 50 %
25 SYRINGE (ML) INTRAVENOUS ONCE
Refills: 0 | Status: DISCONTINUED | OUTPATIENT
Start: 2023-05-25 | End: 2023-05-26

## 2023-05-25 RX ORDER — RIVAROXABAN 15 MG-20MG
1 KIT ORAL
Qty: 0 | Refills: 0 | DISCHARGE

## 2023-05-25 RX ORDER — RIVAROXABAN 15 MG-20MG
20 KIT ORAL DAILY
Refills: 0 | Status: DISCONTINUED | OUTPATIENT
Start: 2023-05-26 | End: 2023-05-26

## 2023-05-25 RX ORDER — ALLOPURINOL 300 MG
300 TABLET ORAL DAILY
Refills: 0 | Status: DISCONTINUED | OUTPATIENT
Start: 2023-05-26 | End: 2023-05-26

## 2023-05-25 RX ORDER — SIMVASTATIN 20 MG/1
40 TABLET, FILM COATED ORAL AT BEDTIME
Refills: 0 | Status: DISCONTINUED | OUTPATIENT
Start: 2023-05-25 | End: 2023-05-26

## 2023-05-25 RX ORDER — INSULIN LISPRO 100/ML
VIAL (ML) SUBCUTANEOUS
Refills: 0 | Status: DISCONTINUED | OUTPATIENT
Start: 2023-05-25 | End: 2023-05-26

## 2023-05-25 RX ORDER — LEVOCETIRIZINE DIHYDROCHLORIDE 0.5 MG/ML
1 SOLUTION ORAL
Qty: 0 | Refills: 0 | DISCHARGE

## 2023-05-25 RX ORDER — LANOLIN ALCOHOL/MO/W.PET/CERES
5 CREAM (GRAM) TOPICAL ONCE
Refills: 0 | Status: COMPLETED | OUTPATIENT
Start: 2023-05-25 | End: 2023-05-25

## 2023-05-25 RX ORDER — LOSARTAN POTASSIUM 100 MG/1
50 TABLET, FILM COATED ORAL DAILY
Refills: 0 | Status: DISCONTINUED | OUTPATIENT
Start: 2023-05-26 | End: 2023-05-26

## 2023-05-25 RX ORDER — ALLOPURINOL 300 MG
1 TABLET ORAL
Qty: 0 | Refills: 0 | DISCHARGE

## 2023-05-25 RX ORDER — METFORMIN HYDROCHLORIDE 850 MG/1
2 TABLET ORAL
Qty: 0 | Refills: 0 | DISCHARGE

## 2023-05-25 RX ORDER — LOSARTAN POTASSIUM 100 MG/1
1 TABLET, FILM COATED ORAL
Qty: 0 | Refills: 0 | DISCHARGE

## 2023-05-25 RX ORDER — DEXTROSE 50 % IN WATER 50 %
15 SYRINGE (ML) INTRAVENOUS ONCE
Refills: 0 | Status: DISCONTINUED | OUTPATIENT
Start: 2023-05-25 | End: 2023-05-26

## 2023-05-25 RX ORDER — SODIUM CHLORIDE 9 MG/ML
1000 INJECTION, SOLUTION INTRAVENOUS
Refills: 0 | Status: DISCONTINUED | OUTPATIENT
Start: 2023-05-25 | End: 2023-05-26

## 2023-05-25 RX ADMIN — Medication 25 MILLIGRAM(S): at 20:11

## 2023-05-25 RX ADMIN — SIMVASTATIN 40 MILLIGRAM(S): 20 TABLET, FILM COATED ORAL at 23:04

## 2023-05-25 RX ADMIN — Medication 5 MILLIGRAM(S): at 23:04

## 2023-05-25 RX ADMIN — Medication 2: at 18:20

## 2023-05-25 NOTE — H&P ADULT - HISTORY OF PRESENT ILLNESS
HPI:    63 year old male with history of HTN, HLD, DM2, carotid hypersensivity/syncope s/p BTK    PAST MEDICAL & SURGICAL HISTORY:  HTN (hypertension)  DM (diabetes mellitus)  Arthritis  High cholesterol  Paroxysmal atrial fibrillation  Gout  H/O spinal fusion  S/P TKR (total knee replacement), bilateral    FH: type 2 diabetes (Father)        Social History:no smoking, no drugs, no algohol    pertinent home medications:    Inpatient Medications:       Allergies: penicillin (Unknown)  Nuts (Rash)  strawberry (Rash)      ROS:   CONSTITUTIONAL: No fever, weight loss + fatigue  EYES: Pt denies  RESPIRATORY: No cough, wheezing, chills or hemoptysis; No Shortness of Breath  CARDIOVASCULAR: see HPI  GASTROINTESTINAL: Pt denies  NEUROLOGICAL: Pt denies  SKIN: Pt denies   PSYCHIATRIC: Pt denies  HEME/LYMPH: Pt denies    PHYSICAL:  T(C): --  HR: --  BP: --  RR: --  SpO2: --  Wt(kg): --  Appearance: No acute distress, well developed  Eyes: normal appearing conjunctiva, pupils and eyelids  Cardiovascular: Normal S1 S2, No JVD, No murmurs, No edema  Respiratory: Lungs clear to auscultation	bilaterally.  No wheeze, rhonchi, rales noted  Gastrointestinal:  Soft, NT/ND 	  Neurologic:  No deficit noted  Psych: A&Ox3, normal mood/affect  Musculoskeletal: normal gait  Skin: no rash noted, normal color and pigmentation.        LABS:            TSH  Troponin    EKG:    Telemetry:    ECHO:    Prior EP procedures:    Cath / stress / Cardiac CTa:    Assessment Plan:         HPI:    63 year old male with history of HTN, HLD, DM2, carotid hypersensivity and syncope s/p BTK dual chamber pacemaker implanted 10/2022, noted to have pAF and pSVT symptomatic with pre-syncope, here today for EP study/AF ablation/SVT ablation. Pt has been on Xarelto uninterrupted, took this AM.     TTE 07/2022 nml LVEF no valvulvar abnormalities.     PAST MEDICAL & SURGICAL HISTORY:  HTN (hypertension)  DM (diabetes mellitus)  Arthritis  High cholesterol  Paroxysmal atrial fibrillation  Gout  H/O spinal fusion  S/P TKR (total knee replacement), bilateral    FH: type 2 diabetes (Father)        Social History: no smoking, no drugs, no etoh    Home Medications  see below      Allergies: penicillin (Unknown)  Nuts (Rash)  strawberry (Rash)      ROS:   CONSTITUTIONAL: No fever, weight loss + fatigue  EYES: Pt denies  RESPIRATORY: No cough, wheezing, chills or hemoptysis; No Shortness of Breath  CARDIOVASCULAR: see HPI  GASTROINTESTINAL: Pt denies  NEUROLOGICAL: Pt denies  SKIN: Pt denies   PSYCHIATRIC: Pt denies  HEME/LYMPH: Pt denies    PHYSICAL:  VSS  Appearance: No acute distress, well developed  Eyes: normal appearing conjunctiva, pupils and eyelids  Cardiovascular: Normal S1 S2, No JVD, No murmurs, No edema  Respiratory: Lungs clear to auscultation	bilaterally.  No wheeze, rhonchi, rales noted  Gastrointestinal:  Soft, NT/ND 	  Neurologic:  No deficit noted  Psych: A&Ox3, normal mood/affect  Musculoskeletal: normal gait  Skin: no rash noted, normal color and pigmentation.       Assessment Plan:  63 year old male with history of HTN, HLD, DM2, carotid hypersensivity and syncope s/p BTK dual chamber pacemaker implanted 10/2022, noted to have pAF and pSVT symptomatic with pre-syncope, here today for EP study/AF ablation/SVT ablation. Pt has been on Xarelto uninterrupted, took this AM.     -plan for admission after procedure  -continue uninterrupted Xarelto

## 2023-05-25 NOTE — PATIENT PROFILE ADULT - FALL HARM RISK - HARM RISK INTERVENTIONS
Assistance with ambulation/Assistance OOB with selected safe patient handling equipment/Communicate Risk of Fall with Harm to all staff/Discuss with provider need for PT consult/Monitor gait and stability/Provide patient with walking aids - walker, cane, crutches/Reinforce activity limits and safety measures with patient and family/Sit up slowly, dangle for a short time, stand at bedside before walking/Tailored Fall Risk Interventions/Visual Cue: Yellow wristband and red socks/Bed in lowest position, wheels locked, appropriate side rails in place/Call bell, personal items and telephone in reach/Instruct patient to call for assistance before getting out of bed or chair/Non-slip footwear when patient is out of bed/[place] to call system/Physically safe environment - no spills, clutter or unnecessary equipment/Purposeful Proactive Rounding/Room/bathroom lighting operational, light cord in reach

## 2023-05-25 NOTE — PRE-ANESTHESIA EVALUATION ADULT - LAST ECHOCARDIOGRAM
5/10/23 report reviewed.  Borederline LVH, NL LV and RV function, LVEF 60-65%, no significant valvular disease

## 2023-05-26 ENCOUNTER — TRANSCRIPTION ENCOUNTER (OUTPATIENT)
Age: 63
End: 2023-05-26

## 2023-05-26 VITALS
OXYGEN SATURATION: 96 % | HEART RATE: 78 BPM | RESPIRATION RATE: 16 BRPM | DIASTOLIC BLOOD PRESSURE: 79 MMHG | SYSTOLIC BLOOD PRESSURE: 152 MMHG

## 2023-05-26 LAB
A1C WITH ESTIMATED AVERAGE GLUCOSE RESULT: 7.3 % — HIGH (ref 4–5.6)
ESTIMATED AVERAGE GLUCOSE: 163 MG/DL — HIGH (ref 68–114)
GLUCOSE BLDC GLUCOMTR-MCNC: 175 MG/DL — HIGH (ref 70–99)
GLUCOSE BLDC GLUCOMTR-MCNC: 192 MG/DL — HIGH (ref 70–99)
ISTAT ACTK (ACTIVATED CLOTTING TIME KAOLIN): 293 SEC — HIGH (ref 74–137)
ISTAT ACTK (ACTIVATED CLOTTING TIME KAOLIN): 317 SEC — HIGH (ref 74–137)
ISTAT ACTK (ACTIVATED CLOTTING TIME KAOLIN): 335 SEC — HIGH (ref 74–137)
ISTAT ACTK (ACTIVATED CLOTTING TIME KAOLIN): 342 SEC — HIGH (ref 74–137)
ISTAT INR: 1.6 — HIGH (ref 0.88–1.16)
ISTAT PT: 18.4 SEC — HIGH (ref 10–12.9)
ISTAT VENOUS BE: 3 MMOL/L — SIGNIFICANT CHANGE UP (ref -2–3)
ISTAT VENOUS GLUCOSE: 173 MG/DL — HIGH (ref 70–99)
ISTAT VENOUS HCO3: 29 MMOL/L — HIGH (ref 23–28)
ISTAT VENOUS HEMATOCRIT: 39 % — SIGNIFICANT CHANGE UP (ref 39–50)
ISTAT VENOUS HEMOGLOBIN: 13.3 GM/DL — SIGNIFICANT CHANGE UP (ref 13–17)
ISTAT VENOUS IONIZED CALCIUM: 1.22 MMOL/L — SIGNIFICANT CHANGE UP (ref 1.12–1.3)
ISTAT VENOUS PCO2: 48 MMHG — SIGNIFICANT CHANGE UP (ref 41–51)
ISTAT VENOUS PH: 7.38 — SIGNIFICANT CHANGE UP (ref 7.31–7.41)
ISTAT VENOUS PO2: <66 MMHG — SIGNIFICANT CHANGE UP (ref 35–40)
ISTAT VENOUS POTASSIUM: 3.8 MMOL/L — SIGNIFICANT CHANGE UP (ref 3.5–5.3)
ISTAT VENOUS SO2: 67 % — SIGNIFICANT CHANGE UP
ISTAT VENOUS SODIUM: 140 MMOL/L — SIGNIFICANT CHANGE UP (ref 135–145)
ISTAT VENOUS TCO2: 30 MMOL/L — SIGNIFICANT CHANGE UP (ref 22–31)
POCT ISTAT CREATININE: 0.8 MG/DL — SIGNIFICANT CHANGE UP (ref 0.5–1.3)

## 2023-05-26 PROCEDURE — 83036 HEMOGLOBIN GLYCOSYLATED A1C: CPT

## 2023-05-26 PROCEDURE — C1760: CPT

## 2023-05-26 PROCEDURE — 86900 BLOOD TYPING SEROLOGIC ABO: CPT

## 2023-05-26 PROCEDURE — 85670 THROMBIN TIME PLASMA: CPT

## 2023-05-26 PROCEDURE — 82947 ASSAY GLUCOSE BLOOD QUANT: CPT

## 2023-05-26 PROCEDURE — 85027 COMPLETE CBC AUTOMATED: CPT

## 2023-05-26 PROCEDURE — C1769: CPT

## 2023-05-26 PROCEDURE — 75572 CT HRT W/3D IMAGE: CPT

## 2023-05-26 PROCEDURE — 84295 ASSAY OF SERUM SODIUM: CPT

## 2023-05-26 PROCEDURE — 36415 COLL VENOUS BLD VENIPUNCTURE: CPT

## 2023-05-26 PROCEDURE — 82803 BLOOD GASES ANY COMBINATION: CPT

## 2023-05-26 PROCEDURE — C1733: CPT

## 2023-05-26 PROCEDURE — C1730: CPT

## 2023-05-26 PROCEDURE — 93010 ELECTROCARDIOGRAM REPORT: CPT

## 2023-05-26 PROCEDURE — 82962 GLUCOSE BLOOD TEST: CPT

## 2023-05-26 PROCEDURE — 84132 ASSAY OF SERUM POTASSIUM: CPT

## 2023-05-26 PROCEDURE — 86850 RBC ANTIBODY SCREEN: CPT

## 2023-05-26 PROCEDURE — C1759: CPT

## 2023-05-26 PROCEDURE — C1894: CPT

## 2023-05-26 PROCEDURE — 85610 PROTHROMBIN TIME: CPT

## 2023-05-26 PROCEDURE — 93005 ELECTROCARDIOGRAM TRACING: CPT

## 2023-05-26 PROCEDURE — 85347 COAGULATION TIME ACTIVATED: CPT

## 2023-05-26 PROCEDURE — C1766: CPT

## 2023-05-26 PROCEDURE — 80048 BASIC METABOLIC PNL TOTAL CA: CPT

## 2023-05-26 PROCEDURE — 85730 THROMBOPLASTIN TIME PARTIAL: CPT

## 2023-05-26 PROCEDURE — 86901 BLOOD TYPING SEROLOGIC RH(D): CPT

## 2023-05-26 PROCEDURE — 82565 ASSAY OF CREATININE: CPT

## 2023-05-26 PROCEDURE — 85014 HEMATOCRIT: CPT

## 2023-05-26 PROCEDURE — 82330 ASSAY OF CALCIUM: CPT

## 2023-05-26 RX ADMIN — LOSARTAN POTASSIUM 50 MILLIGRAM(S): 100 TABLET, FILM COATED ORAL at 05:51

## 2023-05-26 RX ADMIN — Medication 2: at 09:10

## 2023-05-26 RX ADMIN — Medication 25 MILLIGRAM(S): at 05:50

## 2023-05-26 NOTE — DISCHARGE NOTE PROVIDER - NSDCCPCAREPLAN_GEN_ALL_CORE_FT
PRINCIPAL DISCHARGE DIAGNOSIS  Diagnosis: Paroxysmal atrial fibrillation  Assessment and Plan of Treatment:

## 2023-05-26 NOTE — DISCHARGE NOTE PROVIDER - NSDCFUADDINST_GEN_ALL_CORE_FT
You had ablation of Atrial fibrillation on 5/25/23.    Please continue all your current medications including Xarelto.   Please follow up with Dr. Nunes in 5-6 weeks.  Call office to make appointment [phone].    Wound care instruction:  Avoid strenuous activities such as lifting, running, pushing or sex for 1 week in order to avoid bleeding complications in the groin.  If any questions about the groin, call us at [phone].  Ok to shower tonight.  Avoid bathing for 1 week

## 2023-05-26 NOTE — DISCHARGE NOTE PROVIDER - CARE PROVIDER_API CALL
Raf Nunes (MD)  Cardiac Electrophysiology; Cardiovascular Disease  [address] [place] [address]  Phone: [phone]  Fax: [phone]  Follow Up Time:

## 2023-05-26 NOTE — DISCHARGE NOTE PROVIDER - HOSPITAL COURSE
63 year old male with history of HTN, HLD, DM2, carotid hypersensivity and syncope s/p BTK dual chamber pacemaker implanted 10/2022, noted to have pAF and pSVT symptomatic with pre-syncope, here today for EP study/AF ablation/SVT ablation. Pt has been on Xarelto uninterrupted.   s/p AFIB ablation on 5/25/23. Xarelto was continued post procedure. VSS. No issue overnight. NSR on tele.   Bilateral groin wounds stable without hematoma.   Stable for d/c home.

## 2023-05-26 NOTE — DISCHARGE NOTE PROVIDER - CARE PROVIDERS DIRECT ADDRESSES
,[email or web address]

## 2023-05-26 NOTE — DISCHARGE NOTE PROVIDER - NSDCMRMEDTOKEN_GEN_ALL_CORE_FT
allopurinol 300 mg oral tablet: 1 tab(s) orally once a day (at bedtime)  losartan 50 mg oral tablet: 1 tab(s) orally once a day  metFORMIN 500 mg oral tablet: 2 tab(s) orally 2 times a day  metoprolol succinate 50 mg oral tablet, extended release: 0.5 tab(s) orally 2 times a day  simvastatin 40 mg oral tablet: 1 tab(s) orally once a day (at bedtime)  Xarelto 20 mg oral tablet: 1 tab(s) orally once a day (in the morning)    RESTART 10/13/22

## 2023-05-26 NOTE — DISCHARGE NOTE NURSING/CASE MANAGEMENT/SOCIAL WORK - PATIENT PORTAL LINK FT
You can access the FollowMyHealth Patient Portal offered by [facility] by registering at the following website: [email or web address]. By joining [facility]’s FollowMyHealth portal, you will also be able to view your health information using other applications (apps) compatible with our system.

## 2023-06-02 DIAGNOSIS — M19.90 UNSPECIFIED OSTEOARTHRITIS, UNSPECIFIED SITE: ICD-10-CM

## 2023-06-02 DIAGNOSIS — E11.9 TYPE 2 DIABETES MELLITUS WITHOUT COMPLICATIONS: ICD-10-CM

## 2023-06-02 DIAGNOSIS — I48.0 PAROXYSMAL ATRIAL FIBRILLATION: ICD-10-CM

## 2023-06-02 DIAGNOSIS — Z83.3 FAMILY HISTORY OF DIABETES MELLITUS: ICD-10-CM

## 2023-06-02 DIAGNOSIS — Z95.0 PRESENCE OF CARDIAC PACEMAKER: ICD-10-CM

## 2023-06-02 DIAGNOSIS — Z79.01 LONG TERM (CURRENT) USE OF ANTICOAGULANTS: ICD-10-CM

## 2023-06-02 DIAGNOSIS — Z91.018 ALLERGY TO OTHER FOODS: ICD-10-CM

## 2023-06-02 DIAGNOSIS — Z96.653 PRESENCE OF ARTIFICIAL KNEE JOINT, BILATERAL: ICD-10-CM

## 2023-06-02 DIAGNOSIS — Z79.84 LONG TERM (CURRENT) USE OF ORAL HYPOGLYCEMIC DRUGS: ICD-10-CM

## 2023-06-02 DIAGNOSIS — I10 ESSENTIAL (PRIMARY) HYPERTENSION: ICD-10-CM

## 2023-06-02 DIAGNOSIS — E78.00 PURE HYPERCHOLESTEROLEMIA, UNSPECIFIED: ICD-10-CM

## 2023-06-02 DIAGNOSIS — M10.9 GOUT, UNSPECIFIED: ICD-10-CM

## 2023-06-02 DIAGNOSIS — I47.1 SUPRAVENTRICULAR TACHYCARDIA: ICD-10-CM

## 2023-06-02 DIAGNOSIS — Z88.0 ALLERGY STATUS TO PENICILLIN: ICD-10-CM

## 2023-08-18 ENCOUNTER — APPOINTMENT (OUTPATIENT)
Dept: HEART AND VASCULAR | Facility: CLINIC | Age: 63
End: 2023-08-18
Payer: COMMERCIAL

## 2023-08-18 VITALS
BODY MASS INDEX: 30.66 KG/M2 | HEIGHT: 78 IN | TEMPERATURE: 97.1 F | DIASTOLIC BLOOD PRESSURE: 60 MMHG | WEIGHT: 265 LBS | HEART RATE: 77 BPM | SYSTOLIC BLOOD PRESSURE: 130 MMHG

## 2023-08-18 DIAGNOSIS — R55 SYNCOPE AND COLLAPSE: ICD-10-CM

## 2023-08-18 DIAGNOSIS — I47.10 SUPRAVENTRICULAR TACHYCARDIA, UNSPECIFIED: ICD-10-CM

## 2023-08-18 DIAGNOSIS — Z86.79 PERSONAL HISTORY OF OTHER DISEASES OF THE CIRCULATORY SYSTEM: ICD-10-CM

## 2023-08-18 PROCEDURE — 99213 OFFICE O/P EST LOW 20 MIN: CPT | Mod: 25

## 2023-08-18 PROCEDURE — 93280 PM DEVICE PROGR EVAL DUAL: CPT

## 2023-10-26 PROBLEM — Z86.79: Status: ACTIVE | Noted: 2023-10-26

## 2023-10-26 PROBLEM — I47.10 SVT (SUPRAVENTRICULAR TACHYCARDIA): Status: ACTIVE | Noted: 2018-04-23

## 2023-10-26 PROBLEM — R55 NEAR SYNCOPE: Status: ACTIVE | Noted: 2017-09-27

## 2024-03-05 ENCOUNTER — APPOINTMENT (OUTPATIENT)
Dept: HEART AND VASCULAR | Facility: CLINIC | Age: 64
End: 2024-03-05
Payer: COMMERCIAL

## 2024-03-05 ENCOUNTER — NON-APPOINTMENT (OUTPATIENT)
Age: 64
End: 2024-03-05

## 2024-03-05 VITALS
SYSTOLIC BLOOD PRESSURE: 131 MMHG | TEMPERATURE: 97.7 F | BODY MASS INDEX: 31.82 KG/M2 | DIASTOLIC BLOOD PRESSURE: 65 MMHG | WEIGHT: 275 LBS | HEART RATE: 84 BPM | HEIGHT: 78 IN

## 2024-03-05 DIAGNOSIS — Z79.01 LONG TERM (CURRENT) USE OF ANTICOAGULANTS: ICD-10-CM

## 2024-03-05 DIAGNOSIS — Z95.0 PRESENCE OF CARDIAC PACEMAKER: ICD-10-CM

## 2024-03-05 DIAGNOSIS — I48.0 PAROXYSMAL ATRIAL FIBRILLATION: ICD-10-CM

## 2024-03-05 PROCEDURE — 99213 OFFICE O/P EST LOW 20 MIN: CPT

## 2024-03-05 PROCEDURE — 93280 PM DEVICE PROGR EVAL DUAL: CPT

## 2024-03-05 NOTE — HISTORY OF PRESENT ILLNESS
[None] : The patient complains of no symptoms [FreeTextEntry1] : 65 y/o M h/o HTN, HLD, DM II, palpitations and presyncope now s/p Medtronic ILR implant with paroxysmal atrial fibrillation and SVT with aberrancy.  ILR reached VIAJY and was explanted 7/2022.  He had a significant syncope without prodrome with facial trauma (nose fracture).  Exam at [facility] significant for carotid hypersensitivity.  S/P dual chamber PPM placement 10/10/22.   Tolerating Xarelto without bleeding issues. He has not had recurrent, de syncope.  Notes one episode of lightheadedness 9/17/23 that corresponds with non sustained AT (24 seconds).    TTE 7/30/22 revealed normal LVEF w/o valvular disease   SEE PACEART for Pacemaker interrogation.  cMRI 12/2017 LVEF 49%, mild global HK, RV normal size/function, hyperenhancement of superior and inferior right ventricular insertion points (nonspecific),  Event monitor 8/24 - 9/4/16: SR/ST, rare PACs, slow WCT possibly NSVT (correlates with symptom) CTA 6/2016 with nonobstructive CAD

## 2024-03-05 NOTE — ADDENDUM
[FreeTextEntry1] : I, Ashlee Cedeno, am scribing for and the presence of Dr. Nunes the following sections: HPI, PMH,Family/social history, ROS, Physical Exam, Assessment / Plan.  I, Raf Nunes, personally performed the services described in the documentation, reviewed the documentation recorded by the scribe in my presence and it accurately and completely records my words and actions.

## 2024-03-05 NOTE — PHYSICAL EXAM
[General Appearance - Well Developed] : well developed [Normal Appearance] : normal appearance [Well Groomed] : well groomed [General Appearance - Well Nourished] : well nourished [No Deformities] : no deformities [General Appearance - In No Acute Distress] : no acute distress [Heart Rate And Rhythm] : heart rate and rhythm were normal [Heart Sounds] : normal S1 and S2 [Murmurs] : no murmurs present [Respiration, Rhythm And Depth] : normal respiratory rhythm and effort [Exaggerated Use Of Accessory Muscles For Inspiration] : no accessory muscle use [Auscultation Breath Sounds / Voice Sounds] : lungs were clear to auscultation bilaterally [Left Infraclavicular] : left infraclavicular area [Clean] : clean [Dry] : dry [Well-Healed] : well-healed [Abdomen Soft] : soft [Abdomen Tenderness] : non-tender [Abdomen Mass (___ Cm)] : no abdominal mass palpated [Nail Clubbing] : no clubbing of the fingernails [Cyanosis, Localized] : no localized cyanosis [Petechial Hemorrhages (___cm)] : no petechial hemorrhages [Normal Conjunctiva] : the conjunctiva exhibited no abnormalities [Eyelids - No Xanthelasma] : the eyelids demonstrated no xanthelasmas [Normal Oral Mucosa] : normal oral mucosa [No Oral Pallor] : no oral pallor [No Oral Cyanosis] : no oral cyanosis [Normal Jugular Venous A Waves Present] : normal jugular venous A waves present [Normal Jugular Venous V Waves Present] : normal jugular venous V waves present [No Jugular Venous Salmon A Waves] : no jugular venous salmon A waves [Gait - Sufficient For Exercise Testing] : the gait was sufficient for exercise testing [Abnormal Walk] : normal gait [Skin Color & Pigmentation] : normal skin color and pigmentation [] : no rash [No Venous Stasis] : no venous stasis [Skin Lesions] : no skin lesions [No Skin Ulcers] : no skin ulcer [No Xanthoma] : no  xanthoma was observed [Oriented To Time, Place, And Person] : oriented to person, place, and time [Mood] : the mood was normal [Affect] : the affect was normal [No Anxiety] : not feeling anxious

## 2024-03-13 NOTE — PATIENT PROFILE ADULT - LEGAL HELP
"Psychiatric Evaluation - Behavioral Health     Identification Data:Jael Fabian 58 y.o. female MRN: [id number]  Unit/Bed#: [facility] 644-02 Encounter: [id number]    Chief Complaint: \" I am embarrassed of what I did\".    History of present illness:    Jael Fabian is a 58 y.o.  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, w/ PMH of hip arthritis, lumbar radiculopathy, spinal stenosis, s/p gastric bypass, secondary hyperparathyroidism, SNHL and PPH of alcohol use disorder, depression and anxiety, 1 prior psychiatric admission (in June 2021 due to depressive symptoms following her 's death),no prior SA (except the recent overdose which led to this admission), no h/o self-injurious behavior, currently in therapy (Mag[facility] at [facility]) and on Pristiq 25 mg daily, trazodone 100 mg nightly and Ativan 0.5 mg twice daily as needed prescribed by her PCP who presented to the ED on 3/12/2024 BIB family to the ED on 3/12/2024 after intentional overdose on Ativan and alcohol. The patient signed 201 and got admitted to the inpatient psychiatry unit 6B for further psychiatric stabilization.      As per ED CW's note on 3/12/24: \"The patient is a 58-year-old female who arrived to the emergency department via family vehicle.  Her 23-year-old son transported her after finding her on the floor with a bloody nose.  The patient later revealed that she had purposefully ingested 60 Ativan 0.5mg along with 12 cans of beer in an attempt to take her life.  The patient has been medically cleared.  She is alert and oriented.  Her speech is somewhat slow and she initially appears to be intent on going home, citing upcoming appointments and her obligation to work.  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  She currently resides in [place] with her 23-year-old son.  She does have another son who lives out " of the home.  She reports that her  passed away in 2021.  She she reportedly began drinking heavily following his death and reports abusing alcohol for a year before becoming sober in 2022.  She had been sober since that time and had been dating someone she was acquainted with through .  They broke up in July.  She reports that she recently discovered that another friend from  passed away and that prompted her to begin drinking yesterday.  She reports that she continued drinking and then ingested the pills in a suicide attempt.  She apparently fell to the floor and her nose was bleeding.  She still has dried blood on her hands and face.  The patient indicated that she wished her son had not brought her to the hospital.  Crisis asked her to clarify and she stated she would have just lied on the floor.  The patient has a flat affect.  She does appear depressed.  She reports feeling depressed and reports feeling anxious about an upcoming move.  She reports that she is downsizing from a 5000 square foot home to a 2 bedroom apartment.  She also implies that her sons are not very helpful and that she is preparing to do this move independently and it is causing her a lot of stress.  The patient denies any past suicide attempts.  She denies past suicidal plans or actions.  She denies any past self-injurious behavior.  The patient denies any current or past homicidal ideas, plan, or intent.  She denies any history of violence or aggression to others.  She denies any hallucinations or delusions.  She denies paranoid thinking.  Her thinking is clear and logical, but her insight is somewhat impaired as she appeared to believe that she would be discharged home following the overdose attempt.  The patient does report difficulties with sleep.  She is able to fall asleep but struggles to stay asleep.  She estimates sleeping 4 hours per night.  She reports a loss of appetite.  Despite this she has gained approximately 15  "pounds in the last few months.  The patient reports feeling safe at home.  She denies any abuse or traumatic events.  She does imply significant psychosocial loss and limited supports.  The patient reports that she has never been admitted psychiatrically and has no formal mental health providers.  Her PCP prescribes her Ativan.  The patient denies other drug use.  The patient was offered a 201.  She was hesitant.  Crisis did explain the options involving a 201 versus a 302.  She initially agreed to sign the voluntary but when presented she stated \"I am not signing that\".  Peter is again advised her of the implications of a 302 which would be pursued in this case.  She did agree to sign a 201 for voluntary treatment.  She is requesting to stay local, specifically [facility].  Referral has been faxed to intake.\"    The pt was visited on the unit; chart reviewed. Presented calm, cooperative and well related, dressed in hospital attire, w/ fair hygiene, good eye contact, depressed and anxious mood, constricted but reactive affect, tearful at times while talking about her stressors and her late , talking in normal tone, volume and amount, w/ linear thought process, fair insight and judgement.  She reported that she is \"embarrassed\" of her overdose, stated: \"I am a professional.  It was so embarrassing\".  She noted that she has been feeling \"overwhelmed\" due to multiple recent stressors including her hip pain and upcoming hip replacement surgery, selling the house, being notified that her 23-year-old son is dropping classes in college as well as the news about one of her AA friends who recently passed away.  She noted that she was living in a large house with 5 acre land for past 20 years and it has been very high maintenance and she decided to downsize but was not \"emotionally ready\" as the house was sold just 3 days after was listed.  She noted that she was frustrated of removing the staff from the house make them " ready for donation as well as packing/unpacking and moving to a new place while dealing with pain and multiple stressors.  She reportedly was in good mood until recently and mentioned that she has a very good support system including AA friends, her 27-year-old son who recently  and other friends.  She mentioned good function at work and noted that she loves her job.      The patient relapsed on drinking about 2 weeks ago after being sober since , and reportedly has been drinking 6 pack of beers in 2 days.  She mentioned that on the night she had a fall, she opened up a beer and then took 1 Ativan to feel relaxed and then was not sure if she had taken the Ativan or not and took another one, and then could not recall if she took any more because she just blacked out.  She mentioned that she feels remorseful about the overdose and mentioned that she does not want to die, noted that her son was crying after that and she feels sorry about making her family worried.  She reported depressed and anxious mood with interrupted sleep lately.  Denied any changes in appetite and reported decreased energy level. Denied A/VH. No manic sxs, paranoid ideations or fixed delusions were elicited. Denied any history of eating disorder or obsessive/compulsive sxs.  Vehemently denied SI/HI, intent or plan upon direct inquiry at this time. The patient agreed to verbalize any negative thoughts or concerns to the nursing staff, immediately.       Denied any prior h/o self-injurious behavior or SA. No known FH of psychiatric problems. Her sister who was a nurse  in  due to OD on propofol (unclear if it was intentional or accidental OD).  Denied h/o physical or sexual abuse.  She reportedly found her  dead on the floor in  and as per patient he passed away due to reaction to COVID vaccine.  She reported recurrent memory of that day but denied any flashbacks, nightmares or other dissociative symptoms.    She noted  that she was started on Pristiq in 2018 by her PCP as reportedly her 23-year-old son has been taking Pristiq as well.  She has never been on any other psychiatric medication and endorsed good response to Pristiq which was 50 mg at some point but the dose was decreased as her symptoms improved.  Pristiq is being uptitrated to 50 mg p.o. daily; doses to be adjusted as indicated.  The patient was started on Neurontin 100 mg nightly (reported feeling groggy during the day taking Neurontin in the past prescribed for her pain); doses to be adjusted as indicated.  The patient was started on MVI and thiamine.  The patient is willing to continue with AA meetings and agreed to consider PHP upon further inpatient stabilization.        Psychiatric Review Of Systems:  Pertinent items are noted in HPI; all others negative    Historical Information     Past Psychiatric History:   Past Inpatient Psychiatric Treatment:   One past inpatient psychiatric admission in June 2021  Past Outpatient Psychiatric Treatment:    Most recently in outpatient psychiatric treatment with a family physician  Has a therapist at [facility] (Magda)  Past Suicide Attempts: no  Past Violent Behavior: no  Past Psychiatric Medication Trials:  Pristiq, Neurontin, Lyrica, trazodone, Ativan    Substance Abuse History:  The patient reported history of binge drinking alcohol after lost her  in 2021 and reportedly has been active in AA meetings and was sober since 2022 until recently relapsed on drinking alcohol about 2 weeks ago; reportedly has been drinking a sixpack of beers over 2 days.  Denied history of DUI.  Denied smoking cigarettes or other illicit substance abuse.  Social History     Substance and Sexual Activity   Alcohol Use Not Currently     Social History     Substance and Sexual Activity   Drug Use No         Family Psychiatric History:   Family History   Problem Relation Age of Onset    Hypertension Mother     Heart disease Mother      Hypertension Father     Heart disease Father     No Known Problems Sister     No Known Problems Brother     No Known Problems Maternal Aunt     No Known Problems Maternal Uncle     No Known Problems Paternal Aunt     No Known Problems Paternal Uncle     No Known Problems Maternal Grandmother     No Known Problems Maternal Grandfather     No Known Problems Paternal Grandmother     No Known Problems Paternal Grandfather     ADD / ADHD Neg Hx     Anesthesia problems Neg Hx     Cancer Neg Hx     Clotting disorder Neg Hx     Collagen disease Neg Hx     Diabetes Neg Hx     Dislocations Neg Hx     Learning disabilities Neg Hx     Neurological problems Neg Hx     Osteoporosis Neg Hx     Rheumatologic disease Neg Hx     Scoliosis Neg Hx     Vascular Disease Neg Hx        Social History:  Social History     Socioeconomic History    Marital status:      Spouse name: Not on file    Number of children: Not on file    Years of education: Not on file    Highest education level: Not on file   Occupational History    Not on file   Tobacco Use    Smoking status: Never    Smokeless tobacco: Never   Vaping Use    Vaping status: Never Used   Substance and Sexual Activity    Alcohol use: Not Currently    Drug use: No    Sexual activity: Not Currently     Partners: Male     Birth control/protection: Female Sterilization   Other Topics Concern    Not on file   Social History Narrative    Not on file     Social Determinants of Health     Financial Resource Strain: Not on file   Food Insecurity: Not on file   Transportation Needs: Not on file   Physical Activity: Not on file   Stress: Not on file   Social Connections: Not on file   Intimate Partner Violence: Not on file   Housing Stability: Not on file       Developmental:  Education:  Masters degree  Marital history:   Children: 27-year-old son who is  and 23-year-old son with Asperger's who lives with the patient  Living arrangement, social support:  son  Occupational History: Anesthesiology nurse at [employer]  Access to firearms: Denied    Traumatic History:   Abuse:none is reported  Other Traumatic Events:  Reportedly found her  dead on the floor in 2021    Past Medical History:   Diagnosis Date    Abnormal Pap smear of cervix 1987    Anxiety     Depression     Ear problems 1996    Fibrocystic breast     GERD (gastroesophageal reflux disease) 2025    Herpes 1987    High vitamin A level     History of anxiety     History of hypercholesterolemia     History of hypertension     History of obesity     HPV (human papilloma virus) infection 1987    Hyperlipidemia     Lordosis     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    Scoliosis     Secondary hyperparathyroidism, non-renal (HCC)     Spinal stenosis     Tinnitus 1996       Medical Review Of Systems:  Pertinent items are noted in HPI; all others negative    Meds/Allergies   all current active meds have been reviewed, current meds:   Current Facility-Administered Medications   Medication Dose Route Frequency    acetaminophen (TYLENOL) tablet 650 mg  650 mg Oral Q4H PRN    acetaminophen (TYLENOL) tablet 650 mg  650 mg Oral Q4H PRN    acetaminophen (TYLENOL) tablet 975 mg  975 mg Oral Q6H PRN    amLODIPine (NORVASC) tablet 5 mg  5 mg Oral Daily    cephalexin (KEFLEX) capsule 500 mg  500 mg Oral Q12H EFREN    desvenlafaxine succinate (PRISTIQ) 24 hr tablet 50 mg  50 mg Oral Daily    folic acid (FOLVITE) tablet 1 mg  1 mg Oral Daily    gabapentin (NEURONTIN) capsule 100 mg  100 mg Oral HS    hydrOXYzine HCL (ATARAX) tablet 25 mg  25 mg Oral Q6H PRN Max 4/day    LORazepam (ATIVAN) injection 1 mg  1 mg Intramuscular Q6H PRN Max 3/day    LORazepam (ATIVAN) tablet 0.5 mg  0.5 mg Oral Q6H PRN Max 4/day    LORazepam (ATIVAN) tablet 0.5 mg  0.5 mg Oral HS PRN    LORazepam (ATIVAN) tablet 1 mg  1 mg Oral Q6H PRN Max 3/day    methocarbamol (ROBAXIN) tablet 500 mg  500 mg Oral Q6H PRN    multivitamin-minerals  (CENTRUM) tablet 1 tablet  1 tablet Oral Daily    OLANZapine (ZyPREXA) IM injection 5 mg  5 mg Intramuscular Q3H PRN Max 3/day    OLANZapine (ZyPREXA) tablet 2.5 mg  2.5 mg Oral Q4H PRN Max 6/day    OLANZapine (ZyPREXA) tablet 5 mg  5 mg Oral Q4H PRN Max 3/day    OLANZapine (ZyPREXA) tablet 5 mg  5 mg Oral Q3H PRN Max 3/day    polyethylene glycol (MIRALAX) packet 17 g  17 g Oral Daily PRN    senna-docusate sodium (SENOKOT S) 8.6-50 mg per tablet 1 tablet  1 tablet Oral Daily PRN    thiamine tablet 100 mg  100 mg Oral Daily    traZODone (DESYREL) tablet 100 mg  100 mg Oral HS PRN   , and PTA meds:   Prior to Admission Medications   Prescriptions Last Dose Informant Patient Reported? Taking?   Calcium Citrate 1040 MG TABS Past Week Self Yes Yes   Sig: Take by mouth 3 (three) times a day   Desvenlafaxine Succinate ER 25 MG TB24 3/12/2024 Self Yes Yes   Sig: Take 25 mg by mouth   LORazepam (ATIVAN) 0.5 mg tablet 3/11/2024 Self Yes Yes   Sig: Take 0.5 mg by mouth every 8 (eight) hours as needed for anxiety   Multiple Vitamins-Minerals (BARIATRIC MULTIVITAMINS/IRON PO) 3/12/2024 Self Yes Yes   Sig: Take by mouth daily   methocarbamol (ROBAXIN) 500 mg tablet Not Taking  No No   Sig: Take 1 tablet (500 mg total) by mouth 4 (four) times a day for 10 days   Patient not taking: Reported on 3/12/2024   nitrofurantoin (MACROBID) 100 mg capsule 3/12/2024 Self No Yes   Sig: Take 1 capsule (100 mg total) by mouth 2 (two) times a day for 5 days      Facility-Administered Medications: None     Allergies   Allergen Reactions    Other Wheezing     Lobster when a teenager.  Wheezing     Objective      Mental Status Evaluation:  Appearance and attitude: appeared as stated age, cooperative and attentive, casually dressed, wearing eyeglasses, with good hygiene  Eye contact: good  Motor Function: within normal limits, No PMA/PMR  Gait/station: antalgic gait due to R hip pain  Speech: normal for rate, rhythm, volume, latency,  "amount  Language: No overt abnormality  Mood/affect: depressed, anxious / Affect was constricted but reactive, mood congruent, tearful  Thought Processes: sequential and goal-directed  Thought content: denied suicidal ideations or homicidal ideations, no overt delusions elicited  Associations: intact associations  Perceptual disturbances: denies Auditory/Visual/Tactile Hallucinations  Orientation: oriented to time, person, place and to the situational context  Cognitive Function: intact  Memory: recent and remote memory grossly intact  Intellect: average  Fund of knowledge: aware of current events, aware of past history, and vocabulary average  Impulse control: good  Insight/judgment: fair/good    Lab Results: I have personally reviewed pertinent lab results.        WBC   Date Value Ref Range Status   03/13/2024 2.41 (L) 4.31 - 10.16 Thousand/uL Final   05/01/2015 4.58 4.31 - 10.16 Thousand/uL Final     WBC, UA   Date Value Ref Range Status   03/12/2024 10-20 (A) None Seen, 1-2 /hpf Final     MCV   Date Value Ref Range Status   03/13/2024 93 82 - 98 fL Final   07/15/2019 91.4 80.0 - 100.0 fL Final   05/01/2015 89 82 - 98 fL Final     Lab Results   Component Value Date    BUN 11 03/13/2024    SODIUM 139 03/13/2024    CO2 27 03/13/2024     Lab Results   Component Value Date    ALKPHOS 77 03/13/2024     No results found for: \"CPK\", \"CKMB\"  No results found for: \"TSH\"  INR   Date Value Ref Range Status   03/12/2024 0.98 0.84 - 1.19 Final     No results found for: \"APTT\"  No results found for: \"PHENO\"  Sodium   Date Value Ref Range Status   03/13/2024 139 135 - 147 mmol/L Final   07/15/2019 140 135 - 146 mmol/L Final     BUN   Date Value Ref Range Status   03/13/2024 11 5 - 25 mg/dL Final   07/15/2019 10 7 - 25 mg/dL Final     SL AMB BUN/CREATININE RATIO   Date Value Ref Range Status   07/15/2019 NOT APPLICABLE 6 - 22 (calc) Final     Creatinine   Date Value Ref Range Status   03/13/2024 0.68 0.60 - 1.30 mg/dL Final     " "Comment:     Standardized to IDMS reference method   06/08/2015 0.65 0.60 - 1.30 mg/dL Final     Comment:     Standardized to IDMS reference method     TSH 3RD GENERATON   Date Value Ref Range Status   03/13/2024 1.681 0.450 - 4.500 uIU/mL Final     Comment:     The recommended reference ranges for TSH during pregnancy are as follows:   First trimester 0.100 to 2.500 uIU/mL   Second trimester  0.200 to 3.000 uIU/mL   Third trimester 0.300 to 3.000 uIU/m    Note: Normal ranges may not apply to patients who are transgender, non-binary, or whose legal sex, sex at birth, and gender identity differ.  Adult TSH (3rd generation) reference range follows the recommended guidelines of the American Thyroid Association, January, 2020.     WBC   Date Value Ref Range Status   03/13/2024 2.41 (L) 4.31 - 10.16 Thousand/uL Final   05/01/2015 4.58 4.31 - 10.16 Thousand/uL Final     WBC, UA   Date Value Ref Range Status   03/12/2024 10-20 (A) None Seen, 1-2 /hpf Final     No components found for: \"B12\"  Lab Results   Component Value Date    FOLATE 14.0 03/13/2024     No results found for: \"RPR\"      Imaging Studies: reviewed    EKG, Pathology, and Other Studies: reviewed    Code Status:Full code    Patient Strengths/Assets: ability for insight, cooperative, communication skills, family ties, patient is on a voluntary commitment, supportive family/friends, well educated, work skills    Patient Barriers/Limitations: difficulty adapting, substance abuse    Suicide/Homicide Risk Assessment:    Risk of Harm to Self:   Nursing Suicide Risk Assessment Last 24 hours: C-SSRS Risk (Since Last Contact)  Calculated C-SSRS Risk Score (Since Last Contact): No Risk Indicated  Current Specific Risk Factors include: recent suicide attempt, diagnosis of depression, alcohol use  Protective Factors: no current suicidal ideation, ability to communicate with staff on the unit, able to contract for safety on the unit  Based on today's assessment, Jael " presents the following risk of harm to self:  Chronically high risk; low at this time - consented for safety    Risk of Harm to Others:  Nursing Homicide Risk Assessment: Violence Risk to Others: Denies within past 6 months  Current Specific Risk Factors include: none  Protective Factors: no current homicidal ideation  Based on today's assessment, Jael presents the following risk of harm to others: low    The following interventions are recommended: behavioral checks every 7 minutes, continued hospitalization on locked unit    Assessment/Plan     Principal Problem:    Severe episode of recurrent major depressive disorder, without psychotic features (HCC)  Active Problems:    Alcohol use disorder    Plan:   Risks, benefits and possible side effects of Medications:   Risks, benefits, and possible side effects of medications explained to patient and patient verbalizes understanding.       - f/u SLIM recs regarding the medical problems  - MVI, folate and thiamine  - Expand collat information  - withdrawal precaution  - fall precaution  - PT eval  - Continue medication titration and treatment plan; adjust medication to optimize treatment response and as clinically indicated.     Scheduled medications:  Current Facility-Administered Medications   Medication Dose Route Frequency Provider Last Rate    acetaminophen  650 mg Oral Q4H PRN KYLEE Ching      acetaminophen  650 mg Oral Q4H PRN KYLEE Ching      acetaminophen  975 mg Oral Q6H PRN KYLEE Ching      amLODIPine  5 mg Oral Daily KYLEE Vargas      desvenlafaxine succinate  50 mg Oral Daily Koko Benz MD      folic acid  1 mg Oral Daily Koko Benz MD      gabapentin  100 mg Oral HS Koko Benz MD      hydrOXYzine HCL  25 mg Oral Q6H PRN Max 4/day KYLEE Ching      LORazepam  1 mg Intramuscular Q6H PRN Max 3/day KYLEE Ching      LORazepam  0.5 mg Oral Q6H PRN Max 4/day KYLEE Ching       LORazepam  0.5 mg Oral HS PRN Atiya Cha MD      LORazepam  1 mg Oral Q6H PRN Max 3/day Meenakshi Abarca, CRNP      methocarbamol  500 mg Oral Q6H PRN Reina Nixtata, CRNP      multivitamin-minerals  1 tablet Oral Daily Reina Jaeger, CRNP      OLANZapine  5 mg Intramuscular Q3H PRN Max 3/day Meenakshigely Abarca, CRNP      OLANZapine  2.5 mg Oral Q4H PRN Max 6/day Meenakshigely Abarca, CRNP      OLANZapine  5 mg Oral Q4H PRN Max 3/day Meenakshigely Abarca, CRNP      OLANZapine  5 mg Oral Q3H PRN Max 3/day Meenakshigely Abarca, CRNP      polyethylene glycol  17 g Oral Daily PRN Meenakshi Abarca, CRNP      senna-docusate sodium  1 tablet Oral Daily PRN Meenakshigely Abarca, CRNP      thiamine  100 mg Oral Daily Koko Benz MD      traZODone  100 mg Oral HS PRN Koko Benz MD          PRN:    acetaminophen    acetaminophen    acetaminophen    hydrOXYzine HCL    LORazepam    LORazepam    LORazepam    LORazepam    methocarbamol    OLANZapine    OLANZapine    OLANZapine    OLANZapine    polyethylene glycol    senna-docusate sodium    traZODone    - Observation: routine    - VS: as per unit protocol  - Legal status:   201  - Diet: Regular diet  - Psychoeducation (benefits and potential risks) discussed, importance of compliance with the psychiatric treatment reiterated, and the patient verbalized understanding of the matter  - Encourage group attendance and milieu therapy     - The pt was educated and agreed to verbalize any suicidal thoughts, frustrations or concerns to the nursing staff, immediately.    - Dispo: To be determined       Next of Kin  Extended Emergency Contact Information  Primary Emergency Contact: RuiClint  Mobile Phone: [phone]  Relation: Son  Secondary Emergency Contact: mitra alex  Mobile Phone: [phone]  Relation: Son    Koko Benz MD  Attending Psychiatrist         This note was completed in part utilizing Dragon dictation Software.  Grammatical, translation, syntax errors, random word insertions, spelling mistakes, and incomplete sentences may be an occasional consequence of this system secondary to software limitations with voice recognition, ambient noise, and hardware issues. If you have any questions or concerns about the content, text, or information contained within the body of this dictation, please contact the provider for clarification.      no

## 2024-09-10 ENCOUNTER — APPOINTMENT (OUTPATIENT)
Dept: HEART AND VASCULAR | Facility: CLINIC | Age: 64
End: 2024-09-10

## 2024-09-23 ENCOUNTER — NON-APPOINTMENT (OUTPATIENT)
Age: 64
End: 2024-09-23

## 2024-09-24 ENCOUNTER — APPOINTMENT (OUTPATIENT)
Dept: HEART AND VASCULAR | Facility: CLINIC | Age: 64
End: 2024-09-24
Payer: COMMERCIAL

## 2024-09-24 VITALS
HEART RATE: 78 BPM | WEIGHT: 273 LBS | BODY MASS INDEX: 31.59 KG/M2 | SYSTOLIC BLOOD PRESSURE: 140 MMHG | DIASTOLIC BLOOD PRESSURE: 68 MMHG | HEIGHT: 78 IN

## 2024-09-24 DIAGNOSIS — Z95.0 PRESENCE OF CARDIAC PACEMAKER: ICD-10-CM

## 2024-09-24 DIAGNOSIS — I48.0 PAROXYSMAL ATRIAL FIBRILLATION: ICD-10-CM

## 2024-09-24 PROCEDURE — 93280 PM DEVICE PROGR EVAL DUAL: CPT

## 2024-09-24 NOTE — PHYSICAL EXAM
[General Appearance - Well Developed] : well developed [Normal Appearance] : normal appearance [Well Groomed] : well groomed [General Appearance - Well Nourished] : well nourished [No Deformities] : no deformities [General Appearance - In No Acute Distress] : no acute distress [Heart Rate And Rhythm] : heart rate and rhythm were normal [Heart Sounds] : normal S1 and S2 [Murmurs] : no murmurs present [Respiration, Rhythm And Depth] : normal respiratory rhythm and effort [Exaggerated Use Of Accessory Muscles For Inspiration] : no accessory muscle use [Auscultation Breath Sounds / Voice Sounds] : lungs were clear to auscultation bilaterally [Left Infraclavicular] : left infraclavicular area [Clean] : clean [Dry] : dry [Well-Healed] : well-healed [Abdomen Soft] : soft [Abdomen Tenderness] : non-tender [Abdomen Mass (___ Cm)] : no abdominal mass palpated [Cyanosis, Localized] : no localized cyanosis [Nail Clubbing] : no clubbing of the fingernails [Petechial Hemorrhages (___cm)] : no petechial hemorrhages [Normal Conjunctiva] : the conjunctiva exhibited no abnormalities [Eyelids - No Xanthelasma] : the eyelids demonstrated no xanthelasmas [Normal Oral Mucosa] : normal oral mucosa [No Oral Pallor] : no oral pallor [No Oral Cyanosis] : no oral cyanosis [Normal Jugular Venous A Waves Present] : normal jugular venous A waves present [Normal Jugular Venous V Waves Present] : normal jugular venous V waves present [No Jugular Venous Salmon A Waves] : no jugular venous salmon A waves [Abnormal Walk] : normal gait [Gait - Sufficient For Exercise Testing] : the gait was sufficient for exercise testing [Skin Color & Pigmentation] : normal skin color and pigmentation [] : no rash [No Venous Stasis] : no venous stasis [Skin Lesions] : no skin lesions [No Skin Ulcers] : no skin ulcer [No Xanthoma] : no  xanthoma was observed [Oriented To Time, Place, And Person] : oriented to person, place, and time [Affect] : the affect was normal [Mood] : the mood was normal [No Anxiety] : not feeling anxious

## 2024-09-24 NOTE — PROCEDURE
[DDI] : DDI [Lead Imp:  ___ohms] : lead impedance was [unfilled] ohms [Sensing Amplitude ___mv] : sensing amplitude was [unfilled] mv [___V @] : [unfilled] V [___ ms] : [unfilled] ms [de-identified] : [employer]  [de-identified] : Jaz WILDE [de-identified] : 10/10/2022 [de-identified] : 49 [de-identified] : 10 years 7 months  [de-identified] : A/V pacing  0%  16 seconds of AT noted in May 2024

## 2024-09-24 NOTE — ADDENDUM
[FreeTextEntry1] : I, Jamie Armando, am scribing for and the presence of Dr. Mondragon the following sections: HPI, PMH,Family/social history, ROS, Physical Exam, Assessment / Plan.  I, [place] Mond[place], personally performed the services described in the documentation, reviewed the documentation recorded by the scribe in my presence and it accurately and completely records my words and actions.

## 2024-09-24 NOTE — HISTORY OF PRESENT ILLNESS
[None] : The patient complains of no symptoms [FreeTextEntry1] : 65 y/o M h/o HTN, HLD, DM II, palpitations and presyncope now s/p Medtronic ILR implant with paroxysmal atrial fibrillation and SVT with aberrancy.  ILR reached VIJAY and was explanted 7/2022.  He had a significant syncope without prodrome with facial trauma (nose fracture).  Exam at [facility] significant for carotid hypersensitivity.  S/P dual chamber PPM placement 10/10/22.   He underwent cryoablation 5/2023 (PVI, no SVT). Currently maintained Metoprolol 25mg PO BID.  Tolerating Xarelto without bleeding issues. He has not had recurrent, de syncope.  Notes one episode of lightheadedness 9/17/23 that corresponds with non sustained AT (24 seconds).    TTE 7/30/22 revealed normal LVEF w/o valvular disease   SEE PACEART for Pacemaker interrogation.  cMRI 12/2017 LVEF 49%, mild global HK, RV normal size/function, hyperenhancement of superior and inferior right ventricular insertion points (nonspecific),  Event monitor 8/24 - 9/4/16: SR/ST, rare PACs, slow WCT possibly NSVT (correlates with symptom) CTA 6/2016 with nonobstructive CAD

## 2024-10-03 NOTE — PATIENT PROFILE ADULT - DO YOU FEEL UNSAFE AT HOME, WORK, OR SCHOOL?
Care Due:                  Date            Visit Type   Department     Provider  --------------------------------------------------------------------------------                                MYCHART                              FOLLOWUP/OF  [facility] FAM[facility]  Last Visit: 06-      FICE VISIT   MEDICINE       Moise Aragon  Next Visit: None Scheduled  None         None Found                                                            Last  Test          Frequency    Reason                     Performed    Due Date  --------------------------------------------------------------------------------    Office Visit  15 months..  allopurinoL,               06- 09-                             atorvastatin, colchicine,                             ezetimibe, metFORMIN,                             valsartan, venlafaxine...    CBC.........  12 months..  allopurinoL..............  06- 06-    CMP.........  12 months..  allopurinoL,               06- 06-                             atorvastatin, colchicine,                             ezetimibe, metFORMIN,                             valsartan, venlafaxine...    HBA1C.......  6 months...  metFORMIN................  06- 12-    Lipid Panel.  12 months..  atorvastatin, ezetimibe..  05- 05-    Uric Acid...  12 months..  allopurinoL, colchicine..  11- 11-    [facility] Embedded Care Due Messages. Reference number: [id number].   10/03/2024 9:42:43 AM CDT  
Please see the attached refill request.  
no

## 2024-10-16 ENCOUNTER — APPOINTMENT (OUTPATIENT)
Dept: HEART AND VASCULAR | Facility: CLINIC | Age: 64
End: 2024-10-16
Payer: COMMERCIAL

## 2024-10-16 ENCOUNTER — NON-APPOINTMENT (OUTPATIENT)
Age: 64
End: 2024-10-16

## 2024-10-16 VITALS
BODY MASS INDEX: 31.82 KG/M2 | DIASTOLIC BLOOD PRESSURE: 82 MMHG | HEIGHT: 78 IN | OXYGEN SATURATION: 97 % | SYSTOLIC BLOOD PRESSURE: 157 MMHG | WEIGHT: 275 LBS | HEART RATE: 87 BPM

## 2024-10-16 PROCEDURE — 99204 OFFICE O/P NEW MOD 45 MIN: CPT

## 2024-10-16 PROCEDURE — G2211 COMPLEX E/M VISIT ADD ON: CPT | Mod: NC

## 2024-10-16 RX ORDER — DULAGLUTIDE 0.75 MG/.5ML
0.75 INJECTION, SOLUTION SUBCUTANEOUS
Refills: 0 | Status: ACTIVE | COMMUNITY

## 2024-10-16 RX ORDER — TAMSULOSIN HYDROCHLORIDE 0.4 MG/1
0.4 CAPSULE ORAL
Qty: 30 | Refills: 5 | Status: ACTIVE | COMMUNITY

## 2024-10-17 RX ORDER — METOPROLOL SUCCINATE 50 MG/1
50 TABLET, EXTENDED RELEASE ORAL
Qty: 2 | Refills: 0 | Status: ACTIVE | COMMUNITY
Start: 2024-10-16 | End: 1900-01-01

## 2024-10-23 ENCOUNTER — TRANSCRIPTION ENCOUNTER (OUTPATIENT)
Age: 64
End: 2024-10-23

## 2024-11-15 ENCOUNTER — APPOINTMENT (OUTPATIENT)
Dept: CT IMAGING | Facility: HOSPITAL | Age: 64
End: 2024-11-15

## 2024-11-15 ENCOUNTER — OUTPATIENT (OUTPATIENT)
Dept: OUTPATIENT SERVICES | Facility: HOSPITAL | Age: 64
LOS: 1 days | End: 2024-11-15
Payer: COMMERCIAL

## 2024-11-15 DIAGNOSIS — Z96.653 PRESENCE OF ARTIFICIAL KNEE JOINT, BILATERAL: Chronic | ICD-10-CM

## 2024-11-15 DIAGNOSIS — Z98.1 ARTHRODESIS STATUS: Chronic | ICD-10-CM

## 2024-11-15 PROCEDURE — 82565 ASSAY OF CREATININE: CPT

## 2024-11-15 PROCEDURE — 75574 CT ANGIO HRT W/3D IMAGE: CPT

## 2024-11-15 PROCEDURE — 75574 CT ANGIO HRT W/3D IMAGE: CPT | Mod: 26

## 2024-11-19 ENCOUNTER — NON-APPOINTMENT (OUTPATIENT)
Age: 64
End: 2024-11-19

## 2024-11-19 ENCOUNTER — OUTPATIENT (OUTPATIENT)
Dept: OUTPATIENT SERVICES | Facility: HOSPITAL | Age: 64
LOS: 1 days | End: 2024-11-19
Payer: COMMERCIAL

## 2024-11-19 ENCOUNTER — RESULT REVIEW (OUTPATIENT)
Age: 64
End: 2024-11-19

## 2024-11-19 DIAGNOSIS — I48.0 PAROXYSMAL ATRIAL FIBRILLATION: ICD-10-CM

## 2024-11-19 DIAGNOSIS — R06.02 SHORTNESS OF BREATH: ICD-10-CM

## 2024-11-19 DIAGNOSIS — I10 ESSENTIAL (PRIMARY) HYPERTENSION: ICD-10-CM

## 2024-11-19 DIAGNOSIS — Z96.653 PRESENCE OF ARTIFICIAL KNEE JOINT, BILATERAL: Chronic | ICD-10-CM

## 2024-11-19 DIAGNOSIS — Z98.1 ARTHRODESIS STATUS: Chronic | ICD-10-CM

## 2024-11-19 PROCEDURE — C8929: CPT

## 2024-11-19 PROCEDURE — 93306 TTE W/DOPPLER COMPLETE: CPT | Mod: 26

## 2024-11-21 DIAGNOSIS — R06.2 WHEEZING: ICD-10-CM

## 2024-12-11 ENCOUNTER — NON-APPOINTMENT (OUTPATIENT)
Age: 64
End: 2024-12-11

## 2024-12-11 ENCOUNTER — APPOINTMENT (OUTPATIENT)
Dept: GASTROENTEROLOGY | Facility: CLINIC | Age: 64
End: 2024-12-11
Payer: COMMERCIAL

## 2024-12-11 VITALS
OXYGEN SATURATION: 99 % | HEART RATE: 87 BPM | BODY MASS INDEX: 31.82 KG/M2 | DIASTOLIC BLOOD PRESSURE: 86 MMHG | RESPIRATION RATE: 16 BRPM | HEIGHT: 78 IN | TEMPERATURE: 98 F | SYSTOLIC BLOOD PRESSURE: 154 MMHG | WEIGHT: 275 LBS

## 2024-12-11 DIAGNOSIS — Z12.11 ENCOUNTER FOR SCREENING FOR MALIGNANT NEOPLASM OF COLON: ICD-10-CM

## 2024-12-11 PROCEDURE — 99204 OFFICE O/P NEW MOD 45 MIN: CPT

## 2024-12-11 RX ORDER — POLYETHYLENE GLYCOL 3350 AND ELECTROLYTES WITH LEMON FLAVOR 236; 22.74; 6.74; 5.86; 2.97 G/4L; G/4L; G/4L; G/4L; G/4L
236 POWDER, FOR SOLUTION ORAL
Qty: 4000 | Refills: 0 | Status: ACTIVE | COMMUNITY
Start: 2024-12-11 | End: 1900-01-01

## 2025-01-15 NOTE — PATIENT PROFILE ADULT - VISION (WITH CORRECTIVE LENSES IF THE PATIENT USUALLY WEARS THEM):
MRN# [id number] [facility]  Normal vision: sees adequately in most situations; can see medication labels, newsprint

## 2025-03-24 ENCOUNTER — APPOINTMENT (OUTPATIENT)
Dept: GASTROENTEROLOGY | Facility: HOSPITAL | Age: 65
End: 2025-03-24

## 2025-03-24 ENCOUNTER — RESULT REVIEW (OUTPATIENT)
Age: 65
End: 2025-03-24

## 2025-03-24 ENCOUNTER — OUTPATIENT (OUTPATIENT)
Dept: OUTPATIENT SERVICES | Facility: HOSPITAL | Age: 65
LOS: 1 days | Discharge: HOME | End: 2025-03-24
Payer: COMMERCIAL

## 2025-03-24 VITALS — WEIGHT: 265 LBS | HEIGHT: 78 IN

## 2025-03-24 DIAGNOSIS — Z98.1 ARTHRODESIS STATUS: Chronic | ICD-10-CM

## 2025-03-24 DIAGNOSIS — Z96.653 PRESENCE OF ARTIFICIAL KNEE JOINT, BILATERAL: Chronic | ICD-10-CM

## 2025-03-24 PROCEDURE — 88305 TISSUE EXAM BY PATHOLOGIST: CPT | Mod: 26

## 2025-03-24 PROCEDURE — 45385 COLONOSCOPY W/LESION REMOVAL: CPT

## 2025-03-24 PROCEDURE — 82962 GLUCOSE BLOOD TEST: CPT

## 2025-03-24 PROCEDURE — 88305 TISSUE EXAM BY PATHOLOGIST: CPT

## 2025-03-24 PROCEDURE — 45385 COLONOSCOPY W/LESION REMOVAL: CPT | Mod: PT

## (undated) DEVICE — SNARE LESIONHUNTER ROTAT NITNL COLD 10MM

## (undated) DEVICE — POLY TRAP ETRAP